# Patient Record
Sex: FEMALE | Race: WHITE | NOT HISPANIC OR LATINO
[De-identification: names, ages, dates, MRNs, and addresses within clinical notes are randomized per-mention and may not be internally consistent; named-entity substitution may affect disease eponyms.]

---

## 2017-01-24 ENCOUNTER — APPOINTMENT (OUTPATIENT)
Dept: OBGYN | Facility: CLINIC | Age: 62
End: 2017-01-24

## 2017-01-24 VITALS
DIASTOLIC BLOOD PRESSURE: 84 MMHG | OXYGEN SATURATION: 97 % | TEMPERATURE: 97.9 F | WEIGHT: 176 LBS | BODY MASS INDEX: 29.32 KG/M2 | SYSTOLIC BLOOD PRESSURE: 122 MMHG | HEART RATE: 82 BPM | RESPIRATION RATE: 16 BRPM | HEIGHT: 65 IN

## 2017-01-24 DIAGNOSIS — Z82.3 FAMILY HISTORY OF STROKE: ICD-10-CM

## 2017-01-24 DIAGNOSIS — Z82.49 FAMILY HISTORY OF ISCHEMIC HEART DISEASE AND OTHER DISEASES OF THE CIRCULATORY SYSTEM: ICD-10-CM

## 2017-01-26 ENCOUNTER — FORM ENCOUNTER (OUTPATIENT)
Age: 62
End: 2017-01-26

## 2017-01-26 LAB — HPV HIGH+LOW RISK DNA PNL CVX: NEGATIVE

## 2017-01-27 ENCOUNTER — APPOINTMENT (OUTPATIENT)
Dept: MAMMOGRAPHY | Facility: HOSPITAL | Age: 62
End: 2017-01-27

## 2017-01-27 ENCOUNTER — APPOINTMENT (OUTPATIENT)
Dept: RADIOLOGY | Facility: HOSPITAL | Age: 62
End: 2017-01-27

## 2017-01-27 ENCOUNTER — OUTPATIENT (OUTPATIENT)
Dept: OUTPATIENT SERVICES | Facility: HOSPITAL | Age: 62
LOS: 1 days | End: 2017-01-27
Payer: COMMERCIAL

## 2017-01-27 PROCEDURE — 77063 BREAST TOMOSYNTHESIS BI: CPT

## 2017-01-27 PROCEDURE — 77067 SCR MAMMO BI INCL CAD: CPT

## 2017-01-27 PROCEDURE — 77080 DXA BONE DENSITY AXIAL: CPT

## 2017-01-29 LAB — CYTOLOGY CVX/VAG DOC THIN PREP: NORMAL

## 2017-02-01 ENCOUNTER — FORM ENCOUNTER (OUTPATIENT)
Age: 62
End: 2017-02-01

## 2017-02-02 ENCOUNTER — OUTPATIENT (OUTPATIENT)
Dept: OUTPATIENT SERVICES | Facility: HOSPITAL | Age: 62
LOS: 1 days | End: 2017-02-02
Payer: COMMERCIAL

## 2017-02-02 ENCOUNTER — APPOINTMENT (OUTPATIENT)
Dept: ULTRASOUND IMAGING | Facility: HOSPITAL | Age: 62
End: 2017-02-02

## 2017-02-02 PROCEDURE — 76642 ULTRASOUND BREAST LIMITED: CPT

## 2017-11-02 ENCOUNTER — FORM ENCOUNTER (OUTPATIENT)
Age: 62
End: 2017-11-02

## 2017-11-03 ENCOUNTER — APPOINTMENT (OUTPATIENT)
Dept: MAMMOGRAPHY | Facility: HOSPITAL | Age: 62
End: 2017-11-03
Payer: COMMERCIAL

## 2017-11-03 ENCOUNTER — OUTPATIENT (OUTPATIENT)
Dept: OUTPATIENT SERVICES | Facility: HOSPITAL | Age: 62
LOS: 1 days | End: 2017-11-03
Payer: COMMERCIAL

## 2017-11-03 ENCOUNTER — APPOINTMENT (OUTPATIENT)
Dept: ULTRASOUND IMAGING | Facility: HOSPITAL | Age: 62
End: 2017-11-03
Payer: COMMERCIAL

## 2017-11-03 PROCEDURE — 76642 ULTRASOUND BREAST LIMITED: CPT

## 2017-11-03 PROCEDURE — G0206: CPT | Mod: 26,LT

## 2017-11-03 PROCEDURE — 76642 ULTRASOUND BREAST LIMITED: CPT | Mod: 26,LT

## 2017-11-03 PROCEDURE — G0279: CPT | Mod: 26

## 2017-11-03 PROCEDURE — G0279: CPT

## 2017-11-03 PROCEDURE — 77065 DX MAMMO INCL CAD UNI: CPT

## 2018-04-30 ENCOUNTER — APPOINTMENT (OUTPATIENT)
Dept: INTERNAL MEDICINE | Facility: CLINIC | Age: 63
End: 2018-04-30
Payer: COMMERCIAL

## 2018-04-30 VITALS — DIASTOLIC BLOOD PRESSURE: 80 MMHG | SYSTOLIC BLOOD PRESSURE: 128 MMHG

## 2018-04-30 VITALS — HEIGHT: 65 IN | BODY MASS INDEX: 29.16 KG/M2 | WEIGHT: 175 LBS

## 2018-04-30 DIAGNOSIS — Z12.11 ENCOUNTER FOR SCREENING FOR MALIGNANT NEOPLASM OF COLON: ICD-10-CM

## 2018-04-30 PROCEDURE — 99245 OFF/OP CONSLTJ NEW/EST HI 55: CPT

## 2018-04-30 RX ORDER — LOSARTAN POTASSIUM 50 MG/1
50 TABLET, FILM COATED ORAL
Refills: 0 | Status: DISCONTINUED | COMMUNITY
End: 2018-04-30

## 2018-05-31 ENCOUNTER — APPOINTMENT (OUTPATIENT)
Dept: INTERNAL MEDICINE | Facility: HOSPITAL | Age: 63
End: 2018-05-31
Payer: COMMERCIAL

## 2018-05-31 ENCOUNTER — RESULT REVIEW (OUTPATIENT)
Age: 63
End: 2018-05-31

## 2018-05-31 ENCOUNTER — OUTPATIENT (OUTPATIENT)
Dept: OUTPATIENT SERVICES | Facility: HOSPITAL | Age: 63
LOS: 1 days | End: 2018-05-31
Payer: COMMERCIAL

## 2018-05-31 DIAGNOSIS — Z12.11 ENCOUNTER FOR SCREENING FOR MALIGNANT NEOPLASM OF COLON: ICD-10-CM

## 2018-05-31 PROCEDURE — 45380 COLONOSCOPY AND BIOPSY: CPT | Mod: PT

## 2018-05-31 PROCEDURE — 88305 TISSUE EXAM BY PATHOLOGIST: CPT

## 2018-05-31 PROCEDURE — 45380 COLONOSCOPY AND BIOPSY: CPT

## 2018-05-31 PROCEDURE — 88305 TISSUE EXAM BY PATHOLOGIST: CPT | Mod: 26

## 2018-06-11 ENCOUNTER — APPOINTMENT (OUTPATIENT)
Dept: OBGYN | Facility: CLINIC | Age: 63
End: 2018-06-11
Payer: COMMERCIAL

## 2018-06-11 VITALS
BODY MASS INDEX: 29.16 KG/M2 | DIASTOLIC BLOOD PRESSURE: 90 MMHG | HEART RATE: 88 BPM | HEIGHT: 65 IN | SYSTOLIC BLOOD PRESSURE: 160 MMHG | OXYGEN SATURATION: 98 % | WEIGHT: 175 LBS

## 2018-06-11 PROCEDURE — 99396 PREV VISIT EST AGE 40-64: CPT

## 2018-06-14 LAB — HPV HIGH+LOW RISK DNA PNL CVX: NOT DETECTED

## 2018-06-18 LAB — CYTOLOGY CVX/VAG DOC THIN PREP: NORMAL

## 2018-07-12 ENCOUNTER — FORM ENCOUNTER (OUTPATIENT)
Age: 63
End: 2018-07-12

## 2018-07-13 ENCOUNTER — OUTPATIENT (OUTPATIENT)
Dept: OUTPATIENT SERVICES | Facility: HOSPITAL | Age: 63
LOS: 1 days | End: 2018-07-13
Payer: COMMERCIAL

## 2018-07-13 ENCOUNTER — APPOINTMENT (OUTPATIENT)
Dept: ULTRASOUND IMAGING | Facility: HOSPITAL | Age: 63
End: 2018-07-13

## 2018-07-13 ENCOUNTER — APPOINTMENT (OUTPATIENT)
Dept: MAMMOGRAPHY | Facility: HOSPITAL | Age: 63
End: 2018-07-13

## 2018-07-13 DIAGNOSIS — Z00.8 ENCOUNTER FOR OTHER GENERAL EXAMINATION: ICD-10-CM

## 2018-07-13 PROCEDURE — 77067 SCR MAMMO BI INCL CAD: CPT | Mod: 26

## 2018-07-13 PROCEDURE — 77063 BREAST TOMOSYNTHESIS BI: CPT

## 2018-07-13 PROCEDURE — 77067 SCR MAMMO BI INCL CAD: CPT

## 2018-07-13 PROCEDURE — 77063 BREAST TOMOSYNTHESIS BI: CPT | Mod: 26

## 2019-12-09 ENCOUNTER — APPOINTMENT (OUTPATIENT)
Dept: OBGYN | Facility: CLINIC | Age: 64
End: 2019-12-09
Payer: COMMERCIAL

## 2019-12-09 VITALS
OXYGEN SATURATION: 98 % | BODY MASS INDEX: 26.49 KG/M2 | WEIGHT: 159 LBS | HEIGHT: 65 IN | DIASTOLIC BLOOD PRESSURE: 80 MMHG | HEART RATE: 94 BPM | SYSTOLIC BLOOD PRESSURE: 120 MMHG

## 2019-12-09 PROCEDURE — 99396 PREV VISIT EST AGE 40-64: CPT

## 2019-12-09 NOTE — PHYSICAL EXAM
[Awake] : awake [Alert] : alert [Acute Distress] : no acute distress [Thyroid Nodule] : no thyroid nodule [LAD] : no lymphadenopathy [Goiter] : no goiter [Mass] : no breast mass [Axillary LAD] : no axillary lymphadenopathy [Nipple Discharge] : no nipple discharge [Tender] : non tender [Distended] : not distended [H/Smegaly] : no hepatosplenomegaly [Oriented x3] : oriented to person, place, and time [Flat Affect] : affect not flat [Depressed Mood] : not depressed [No Lesions] : no genitalia lesions [Vulvar Atrophy] : no vulvar atrophy [Vulvitis] : no vulvitis [Labia Minora Erythema] : no erythema of the labia minora [Labia Majora Erythema] : no erythema of the labia majora [Labia Majora] : labia major [Labia Minora] : labia minora [Normal] : clitoris [Atrophy] : atrophy [Erythema] : no erythema [Cystocele] : no cystocele [Rectocele] : no rectocele [Dry Mucosa] : moist mucosa [Uterine Prolapse] : no uterine prolapse [No Bleeding] : there was no active vaginal bleeding [Erosion] : had no erosions [Discharge] : had no discharge [Soft] :  the cervix was soft [Pap Obtained] : a Pap smear was performed [Motion Tenderness] : there was no cervical motion tenderness [Tenderness] : nontender [Normal Position] : in a normal position [Mass ___ cm] : no uterine mass was palpated [Uterine Adnexae] : were not tender and not enlarged [Enlarged ___ wks] : not enlarged [Adnexa Tenderness] : were not tender [Ovarian Mass (___ Cm)] : there were no adnexal masses [FreeTextEntry9] : Last colonoscopy 2017

## 2019-12-09 NOTE — HISTORY OF PRESENT ILLNESS
[Hot Flashes] : no hot flashes [Night Sweats] : no night sweats [Vaginal Itching] : no vaginal itching [Dyspareunia] : no dyspareunia [Headache] : no headache [Mood Changes] : no mood changes [Weight Change] : no weight change [Fatigue] : no fatigue [Irritability] : no irritability [Loss of Libido] : no loss of libido [Forgetfulness] : no forgetfulness [Depression] : no depression [Anxiety] : no anxiety [Spotting Between  Menses] : no spotting between menses [Normal Amount/Duration] : was of a normal amount and duration [Regular Cycle Intervals] : periods have been irregular [Menarche Age: ____] : age at menarche was [unfilled] [Menopause Age: ____] : age at menopause was [unfilled] [Menstrual Cramps] : no menstrual cramps [Sexually Active] : is sexually active [Monogamous] : is monogamous [Male ___] : [unfilled] male

## 2019-12-11 ENCOUNTER — FORM ENCOUNTER (OUTPATIENT)
Age: 64
End: 2019-12-11

## 2019-12-11 LAB — HPV HIGH+LOW RISK DNA PNL CVX: NOT DETECTED

## 2019-12-12 ENCOUNTER — OUTPATIENT (OUTPATIENT)
Dept: OUTPATIENT SERVICES | Facility: HOSPITAL | Age: 64
LOS: 1 days | End: 2019-12-12
Payer: COMMERCIAL

## 2019-12-12 ENCOUNTER — APPOINTMENT (OUTPATIENT)
Dept: MAMMOGRAPHY | Facility: HOSPITAL | Age: 64
End: 2019-12-12
Payer: COMMERCIAL

## 2019-12-12 DIAGNOSIS — Z00.8 ENCOUNTER FOR OTHER GENERAL EXAMINATION: ICD-10-CM

## 2019-12-12 PROCEDURE — 77067 SCR MAMMO BI INCL CAD: CPT | Mod: 26

## 2019-12-12 PROCEDURE — 77063 BREAST TOMOSYNTHESIS BI: CPT

## 2019-12-12 PROCEDURE — 77063 BREAST TOMOSYNTHESIS BI: CPT | Mod: 26

## 2019-12-12 PROCEDURE — 77067 SCR MAMMO BI INCL CAD: CPT

## 2019-12-13 LAB — CYTOLOGY CVX/VAG DOC THIN PREP: ABNORMAL

## 2020-11-25 ENCOUNTER — EMERGENCY (EMERGENCY)
Facility: HOSPITAL | Age: 65
LOS: 1 days | Discharge: HOME | End: 2020-11-25
Attending: EMERGENCY MEDICINE | Admitting: EMERGENCY MEDICINE
Payer: COMMERCIAL

## 2020-11-25 VITALS
DIASTOLIC BLOOD PRESSURE: 74 MMHG | HEART RATE: 76 BPM | RESPIRATION RATE: 18 BRPM | SYSTOLIC BLOOD PRESSURE: 153 MMHG | OXYGEN SATURATION: 98 %

## 2020-11-25 VITALS
HEIGHT: 65 IN | RESPIRATION RATE: 18 BRPM | WEIGHT: 154.98 LBS | OXYGEN SATURATION: 99 % | TEMPERATURE: 98 F | HEART RATE: 104 BPM | DIASTOLIC BLOOD PRESSURE: 84 MMHG | SYSTOLIC BLOOD PRESSURE: 212 MMHG

## 2020-11-25 DIAGNOSIS — I10 ESSENTIAL (PRIMARY) HYPERTENSION: ICD-10-CM

## 2020-11-25 LAB
ALBUMIN SERPL ELPH-MCNC: 4 G/DL — SIGNIFICANT CHANGE UP (ref 3.3–5)
ALP SERPL-CCNC: 81 U/L — SIGNIFICANT CHANGE UP (ref 40–120)
ALT FLD-CCNC: 21 U/L — SIGNIFICANT CHANGE UP (ref 10–45)
ANION GAP SERPL CALC-SCNC: 8 MMOL/L — SIGNIFICANT CHANGE UP (ref 5–17)
AST SERPL-CCNC: 24 U/L — SIGNIFICANT CHANGE UP (ref 10–40)
BASOPHILS # BLD AUTO: 0.06 K/UL — SIGNIFICANT CHANGE UP (ref 0–0.2)
BASOPHILS NFR BLD AUTO: 0.9 % — SIGNIFICANT CHANGE UP (ref 0–2)
BILIRUB SERPL-MCNC: 0.5 MG/DL — SIGNIFICANT CHANGE UP (ref 0.2–1.2)
BUN SERPL-MCNC: 11 MG/DL — SIGNIFICANT CHANGE UP (ref 7–23)
CALCIUM SERPL-MCNC: 9.3 MG/DL — SIGNIFICANT CHANGE UP (ref 8.4–10.5)
CHLORIDE SERPL-SCNC: 106 MMOL/L — SIGNIFICANT CHANGE UP (ref 96–108)
CO2 SERPL-SCNC: 30 MMOL/L — SIGNIFICANT CHANGE UP (ref 22–31)
CREAT SERPL-MCNC: 0.72 MG/DL — SIGNIFICANT CHANGE UP (ref 0.5–1.3)
EOSINOPHIL # BLD AUTO: 0.03 K/UL — SIGNIFICANT CHANGE UP (ref 0–0.5)
EOSINOPHIL NFR BLD AUTO: 0.4 % — SIGNIFICANT CHANGE UP (ref 0–6)
GLUCOSE SERPL-MCNC: 121 MG/DL — HIGH (ref 70–99)
HCT VFR BLD CALC: 44.7 % — SIGNIFICANT CHANGE UP (ref 34.5–45)
HGB BLD-MCNC: 15 G/DL — SIGNIFICANT CHANGE UP (ref 11.5–15.5)
IMM GRANULOCYTES NFR BLD AUTO: 0.3 % — SIGNIFICANT CHANGE UP (ref 0–1.5)
LYMPHOCYTES # BLD AUTO: 1.6 K/UL — SIGNIFICANT CHANGE UP (ref 1–3.3)
LYMPHOCYTES # BLD AUTO: 22.9 % — SIGNIFICANT CHANGE UP (ref 13–44)
MCHC RBC-ENTMCNC: 33.6 GM/DL — SIGNIFICANT CHANGE UP (ref 32–36)
MCHC RBC-ENTMCNC: 33.9 PG — SIGNIFICANT CHANGE UP (ref 27–34)
MCV RBC AUTO: 100.9 FL — HIGH (ref 80–100)
MONOCYTES # BLD AUTO: 0.57 K/UL — SIGNIFICANT CHANGE UP (ref 0–0.9)
MONOCYTES NFR BLD AUTO: 8.1 % — SIGNIFICANT CHANGE UP (ref 2–14)
NEUTROPHILS # BLD AUTO: 4.72 K/UL — SIGNIFICANT CHANGE UP (ref 1.8–7.4)
NEUTROPHILS NFR BLD AUTO: 67.4 % — SIGNIFICANT CHANGE UP (ref 43–77)
NRBC # BLD: 0 /100 WBCS — SIGNIFICANT CHANGE UP (ref 0–0)
PLATELET # BLD AUTO: 268 K/UL — SIGNIFICANT CHANGE UP (ref 150–400)
POTASSIUM SERPL-MCNC: 4.4 MMOL/L — SIGNIFICANT CHANGE UP (ref 3.5–5.3)
POTASSIUM SERPL-SCNC: 4.4 MMOL/L — SIGNIFICANT CHANGE UP (ref 3.5–5.3)
PROT SERPL-MCNC: 7.7 G/DL — SIGNIFICANT CHANGE UP (ref 6–8.3)
RBC # BLD: 4.43 M/UL — SIGNIFICANT CHANGE UP (ref 3.8–5.2)
RBC # FLD: 11.9 % — SIGNIFICANT CHANGE UP (ref 10.3–14.5)
SODIUM SERPL-SCNC: 144 MMOL/L — SIGNIFICANT CHANGE UP (ref 135–145)
WBC # BLD: 7 K/UL — SIGNIFICANT CHANGE UP (ref 3.8–10.5)
WBC # FLD AUTO: 7 K/UL — SIGNIFICANT CHANGE UP (ref 3.8–10.5)

## 2020-11-25 PROCEDURE — 93010 ELECTROCARDIOGRAM REPORT: CPT

## 2020-11-25 PROCEDURE — 93005 ELECTROCARDIOGRAM TRACING: CPT

## 2020-11-25 PROCEDURE — 85025 COMPLETE CBC W/AUTO DIFF WBC: CPT

## 2020-11-25 PROCEDURE — 99284 EMERGENCY DEPT VISIT MOD MDM: CPT

## 2020-11-25 PROCEDURE — 99283 EMERGENCY DEPT VISIT LOW MDM: CPT

## 2020-11-25 PROCEDURE — 80053 COMPREHEN METABOLIC PANEL: CPT

## 2020-11-25 PROCEDURE — 36415 COLL VENOUS BLD VENIPUNCTURE: CPT

## 2020-11-25 RX ORDER — LOSARTAN POTASSIUM 100 MG/1
25 TABLET, FILM COATED ORAL DAILY
Refills: 0 | Status: DISCONTINUED | OUTPATIENT
Start: 2020-11-25 | End: 2020-11-28

## 2020-11-25 RX ORDER — LOSARTAN POTASSIUM 100 MG/1
1 TABLET, FILM COATED ORAL
Qty: 21 | Refills: 0
Start: 2020-11-25 | End: 2020-12-15

## 2020-11-25 RX ADMIN — LOSARTAN POTASSIUM 25 MILLIGRAM(S): 100 TABLET, FILM COATED ORAL at 11:33

## 2020-11-25 NOTE — ED PROVIDER NOTE - PATIENT PORTAL LINK FT
You can access the FollowMyHealth Patient Portal offered by [facility] by registering at the following website: [email or web address]. By joining [employer]’s FollowMyHealth portal, you will also be able to view your health information using other applications (apps) compatible with our system.

## 2020-11-25 NOTE — ED PROVIDER NOTE - CLINICAL SUMMARY MEDICAL DECISION MAKING FREE TEXT BOX
asymptomatic HTN, /75 currently, no ekg changes, labs pending to check for end organ damage - if all WNL will dc, will restart Losartan, Family Practice f/u. Asymptomatic HTN, /75 currently, no ekg changes, labs pending to check for end organ damage - if all WNL will dc, will restart Losartan, Family Practice f/u.

## 2020-11-25 NOTE — ED PROVIDER NOTE - ATTENDING CONTRIBUTION TO CARE
Erik with [facility]. Asymptomatic HTN, /75 currently, no ekg changes, labs pending to check for end organ damage - if all WNL will dc, will restart Losartan, Family Practice f/u.  I performed a face to face bedside interview with patient regarding history of present illness, review of symptoms and past medical history. I completed an independent physical exam.  I have discussed the patient's plan of care with Physician Assistant (PA). I agree with note as stated above, having amended the EMR as needed to reflect my findings.   This includes History of Present Illness, HIV, Past Medical/Surgical/Family/Social History, Allergies and Home Medications, Review of Systems, Physical Exam, and any Progress Notes during the time I functioned as the attending physician for this patient.

## 2020-11-25 NOTE — ED PROVIDER NOTE - NSFOLLOWUPCLINICS_GEN_ALL_ED_FT
[facility]  Family Medicine  [address] [place] [id number]  Phone: [phone]  Fax:   Follow Up Time:

## 2020-11-25 NOTE — ED ADULT NURSE NOTE - OBJECTIVE STATEMENT
Pt presents to ED from home with c/o hypertension. Pt states for the past few days she has checked her blood pressure at home and noticed elevated numbers. Reports being on losartan in the past for HTN but has resolved until recently. Pt reports increased stress levels after her  passed away. Denies chest pain or SOB.

## 2020-11-25 NOTE — ED PROVIDER NOTE - NSFOLLOWUPINSTRUCTIONS_ED_ALL_ED_FT
Hypertension    WHAT YOU NEED TO KNOW:    Hypertension is high blood pressure. Your blood pressure is the force of your blood moving against the walls of your arteries. Hypertension causes your blood pressure to get so high that your heart has to work much harder than normal. This can damage your heart. The cause of hypertension may not be known. This is called essential or primary hypertension. Hypertension caused by another medical condition, such as kidney disease, is called secondary hypertension.    DISCHARGE INSTRUCTIONS:    Call 911 for any of the following:   •You have chest pain.      •You have any of the following signs of a heart attack: ?Squeezing, pressure, or pain in your chest      ?You may also have any of the following: ?Discomfort or pain in your back, neck, jaw, stomach, or arm      ?Shortness of breath      ?Nausea or vomiting      ?Lightheadedness or a sudden cold sweat        •You become confused or have difficulty speaking.      •You suddenly feel lightheaded or have trouble breathing.      Return to the emergency department if:   •You have a severe headache or vision loss.      •You have weakness in an arm or leg.       Contact your healthcare provider if:   •You feel faint, dizzy, confused, or drowsy.       •You have been taking your blood pressure medicine but your pressure is higher than your provider says it should be.      •You have questions or concerns about your condition or care.      Medicines: You may need any of the following:   •Antihypertensives may be used to help lower your blood pressure. Several kinds of medicines are available. Your healthcare provider will choose medicines based on the kind of hypertension you have. You may need more than one type of medicine. Take the medicine exactly as directed.       •Diuretics help decrease extra fluid that collects in your body. This will help lower your blood pressure. You may urinate more often while you take this medicine.      •Cholesterol medicine helps lower your cholesterol level. A low cholesterol level helps prevent heart disease and makes it easier to control your blood pressure.       •Take your medicine as directed. Contact your healthcare provider if you think your medicine is not helping or if you have side effects. Tell him or her if you are allergic to any medicine. Keep a list of the medicines, vitamins, and herbs you take. Include the amounts, and when and why you take them. Bring the list or the pill bottles to follow-up visits. Carry your medicine list with you in case of an emergency.      Follow up with your healthcare provider as directed: You will need to return to have your blood pressure checked and to have other lab tests done. Write down your questions so you remember to ask them during your visits.     Stages of hypertension:     Blood Pressure Readings     •Normal blood pressure is 119/79 or lower. Your healthcare provider may only check your blood pressure each year if it stays at a normal level.      •Elevated blood pressure is 120/79 to 129/79. This is sometimes called prehypertension. Your healthcare provider may suggest lifestyle changes to help lower your blood pressure to a normal level. He or she may then check it again in 3 to 6 months.      •Stage 1 hypertension is 130/80 to 139/89. Your provider may recommend lifestyle changes, medication, and checks every 3 to 6 months until your blood pressure is controlled.      •Stage 2 hypertension is 140/90 or higher. Your provider will recommend lifestyle changes and have you take 2 kinds of hypertension medicines. You will also need to have your blood pressure checked monthly until it is controlled.      Manage hypertension:   •Check your blood pressure at home. Avoid smoking, caffeine, and exercise at least 30 minutes before checking your blood pressure. Sit and rest for 5 minutes before you take your blood pressure. Extend your arm and support it on a flat surface. Your arm should be at the same level as your heart. Follow the directions that came with your blood pressure monitor. Check your blood pressure 2 times, 1 minute apart, before you take your medicine in the morning. Also check your blood pressure before your evening meal. Keep a record of your readings and bring it to your follow-up visits. Ask your healthcare provider what your blood pressure should be.   How to take a Blood Pressure           •Manage any other health conditions you have. Health conditions such as diabetes can increase your risk for hypertension. Follow your healthcare provider's instructions and take all your medicines as directed.       •Ask about all medicines. Certain medicines can increase your blood pressure. Examples include oral birth control pills, decongestants, herbal supplements, and NSAIDs, such as ibuprofen. Your healthcare provider can tell you which medicines are safe for you to take. This includes prescription and over-the-counter medicines.      Lifestyle changes you can make to manage hypertension:   •Limit sodium (salt) as directed. Too much sodium can affect your fluid balance. Check labels to find low-sodium or no-salt-added foods. Some low-sodium foods use potassium salts for flavor. Too much potassium can also cause health problems. Your healthcare provider will tell you how much sodium and potassium are safe for you to have in a day. He or she may recommend that you limit sodium to 2,300 mg a day.             •Follow the meal plan recommended by your healthcare provider. A dietitian or your provider can give you more information on low-sodium plans or the DASH (Dietary Approaches to Stop Hypertension) eating plan. The DASH plan is low in sodium, unhealthy fats, and total fat. It is high in potassium, calcium, and fiber.              •Exercise to maintain a healthy weight. Exercise at least 30 minutes per day, on most days of the week. This will help decrease your blood pressure. Ask your healthcare provider about the best exercise plan for you.   Walking for Exercise           •Decrease stress. This may help lower your blood pressure. Learn ways to relax, such as deep breathing or listening to music.      •Limit alcohol as directed. Alcohol can increase your blood pressure. A drink of alcohol is 12 ounces of beer, 5 ounces of wine, or 1½ ounces of liquor.      •Do not smoke. Nicotine and other chemicals in cigarettes and cigars can increase your blood pressure and also cause lung damage. Ask your healthcare provider for information if you currently smoke and need help to quit. E-cigarettes or smokeless tobacco still contain nicotine. Talk to your healthcare provider before you use these products.   Prevent Heart Disease

## 2020-11-25 NOTE — ED PROVIDER NOTE - CHPI ED SYMPTOMS NEG
no diaphoresis/no chest pain/no nausea/no back pain/no dizziness/no fever/no vomiting/no syncope/no shortness of breath

## 2020-12-08 PROBLEM — I10 ESSENTIAL (PRIMARY) HYPERTENSION: Chronic | Status: ACTIVE | Noted: 2020-11-25

## 2020-12-09 ENCOUNTER — APPOINTMENT (OUTPATIENT)
Dept: FAMILY MEDICINE | Facility: CLINIC | Age: 65
End: 2020-12-09
Payer: COMMERCIAL

## 2020-12-09 ENCOUNTER — APPOINTMENT (OUTPATIENT)
Dept: OBGYN | Facility: CLINIC | Age: 65
End: 2020-12-09

## 2020-12-09 VITALS
RESPIRATION RATE: 14 BRPM | WEIGHT: 156 LBS | DIASTOLIC BLOOD PRESSURE: 84 MMHG | TEMPERATURE: 98 F | SYSTOLIC BLOOD PRESSURE: 168 MMHG | BODY MASS INDEX: 25.99 KG/M2 | HEART RATE: 86 BPM | OXYGEN SATURATION: 99 % | HEIGHT: 65 IN

## 2020-12-09 DIAGNOSIS — Z11.59 ENCOUNTER FOR SCREENING FOR OTHER VIRAL DISEASES: ICD-10-CM

## 2020-12-09 DIAGNOSIS — Z86.79 PERSONAL HISTORY OF OTHER DISEASES OF THE CIRCULATORY SYSTEM: ICD-10-CM

## 2020-12-09 DIAGNOSIS — Z63.4 DISAPPEARANCE AND DEATH OF FAMILY MEMBER: ICD-10-CM

## 2020-12-09 DIAGNOSIS — R92.8 OTHER ABNORMAL AND INCONCLUSIVE FINDINGS ON DIAGNOSTIC IMAGING OF BREAST: ICD-10-CM

## 2020-12-09 PROCEDURE — 99072 ADDL SUPL MATRL&STAF TM PHE: CPT

## 2020-12-09 PROCEDURE — 99203 OFFICE O/P NEW LOW 30 MIN: CPT

## 2020-12-09 SDOH — SOCIAL STABILITY - SOCIAL INSECURITY: DISSAPEARANCE AND DEATH OF FAMILY MEMBER: Z63.4

## 2020-12-09 NOTE — ASSESSMENT
[FreeTextEntry1] : bp elevated today, advised will note in chart, keep a home bp log for the next 2 weeks bring to next visit. \par avoid high sodium diet, caffeinated beverages. exercise, keep fit weight\par script for comprehensive fasting blood work provided\par RTO in 2 weeks for CPE visit.

## 2020-12-09 NOTE — HISTORY OF PRESENT ILLNESS
[FreeTextEntry8] : establish care, concern HTN\par Ms Marielena Landaverde is a 66 yo female presents today to establish care. Pt recently was in [place] [facility] for elevated bp. In the past was on Losartan which later stopped, when bp optimized. Lately bp rising again. Pt lost  in April 2020, pt also in bereavement. Pt advised today to got comprehensive fasting labwork will return for comprehensive physical. otherwise denies any active complaint.

## 2020-12-09 NOTE — HEALTH RISK ASSESSMENT
[Intercurrent ED visits] : went to ED [Yes] : Yes [No] : In the past 12 months have you used drugs other than those required for medical reasons? No [No falls in past year] : Patient reported no falls in the past year [] : No [de-identified] : Bandar HENRY [de-identified] : wine [FreeTextEntry1] : bereavement from recent loss of ever April 2020

## 2020-12-09 NOTE — PHYSICAL EXAM
[No Acute Distress] : no acute distress [Well Nourished] : well nourished [EOMI] : extraocular movements intact [Normal Oropharynx] : the oropharynx was normal [Supple] : supple [Clear to Auscultation] : lungs were clear to auscultation bilaterally [Normal S1, S2] : normal S1 and S2 [No Edema] : there was no peripheral edema [Soft] : abdomen soft [Non Tender] : non-tender [No Rash] : no rash [Normal Gait] : normal gait [Normal Affect] : the affect was normal

## 2020-12-16 ENCOUNTER — OUTPATIENT (OUTPATIENT)
Dept: OUTPATIENT SERVICES | Facility: HOSPITAL | Age: 65
LOS: 1 days | End: 2020-12-16
Payer: COMMERCIAL

## 2020-12-16 ENCOUNTER — RESULT REVIEW (OUTPATIENT)
Age: 65
End: 2020-12-16

## 2020-12-16 ENCOUNTER — APPOINTMENT (OUTPATIENT)
Dept: MAMMOGRAPHY | Facility: HOSPITAL | Age: 65
End: 2020-12-16
Payer: COMMERCIAL

## 2020-12-16 DIAGNOSIS — Z00.8 ENCOUNTER FOR OTHER GENERAL EXAMINATION: ICD-10-CM

## 2020-12-16 PROBLEM — Z12.11 ENCOUNTER FOR SCREENING COLONOSCOPY: Status: RESOLVED | Noted: 2018-04-30 | Resolved: 2020-12-16

## 2020-12-16 PROCEDURE — 77063 BREAST TOMOSYNTHESIS BI: CPT | Mod: 26

## 2020-12-16 PROCEDURE — 77067 SCR MAMMO BI INCL CAD: CPT | Mod: 26

## 2020-12-16 PROCEDURE — 77067 SCR MAMMO BI INCL CAD: CPT

## 2020-12-16 PROCEDURE — 77063 BREAST TOMOSYNTHESIS BI: CPT

## 2020-12-18 LAB
25(OH)D3 SERPL-MCNC: 56.6 NG/ML
ALBUMIN SERPL ELPH-MCNC: 4.5 G/DL
ALP BLD-CCNC: 80 U/L
ALT SERPL-CCNC: 15 U/L
ANION GAP SERPL CALC-SCNC: 13 MMOL/L
APPEARANCE: CLEAR
AST SERPL-CCNC: 15 U/L
BASOPHILS # BLD AUTO: 0.07 K/UL
BASOPHILS NFR BLD AUTO: 1.3 %
BILIRUB SERPL-MCNC: 0.4 MG/DL
BILIRUBIN URINE: NEGATIVE
BLOOD URINE: NEGATIVE
BUN SERPL-MCNC: 18 MG/DL
CALCIUM SERPL-MCNC: 9.4 MG/DL
CHLORIDE SERPL-SCNC: 103 MMOL/L
CHOLEST SERPL-MCNC: 169 MG/DL
CO2 SERPL-SCNC: 26 MMOL/L
COLOR: NORMAL
CREAT SERPL-MCNC: 0.81 MG/DL
EOSINOPHIL # BLD AUTO: 0.07 K/UL
EOSINOPHIL NFR BLD AUTO: 1.3 %
ESTIMATED AVERAGE GLUCOSE: 105 MG/DL
FOLATE SERPL-MCNC: >20 NG/ML
GLUCOSE QUALITATIVE U: NEGATIVE
GLUCOSE SERPL-MCNC: 104 MG/DL
HBA1C MFR BLD HPLC: 5.3 %
HCT VFR BLD CALC: 44.1 %
HCV AB SER QL: NONREACTIVE
HCV S/CO RATIO: 0.09 S/CO
HDLC SERPL-MCNC: 50 MG/DL
HGB BLD-MCNC: 14.2 G/DL
HIV1+2 AB SPEC QL IA.RAPID: NONREACTIVE
IMM GRANULOCYTES NFR BLD AUTO: 0.2 %
KETONES URINE: NEGATIVE
LDLC SERPL CALC-MCNC: 103 MG/DL
LDLC SERPL DIRECT ASSAY-MCNC: 108 MG/DL
LEUKOCYTE ESTERASE URINE: NEGATIVE
LYMPHOCYTES # BLD AUTO: 1.83 K/UL
LYMPHOCYTES NFR BLD AUTO: 34.6 %
MAN DIFF?: NORMAL
MCHC RBC-ENTMCNC: 32.2 GM/DL
MCHC RBC-ENTMCNC: 32.6 PG
MCV RBC AUTO: 101.1 FL
MONOCYTES # BLD AUTO: 0.39 K/UL
MONOCYTES NFR BLD AUTO: 7.4 %
NEUTROPHILS # BLD AUTO: 2.92 K/UL
NEUTROPHILS NFR BLD AUTO: 55.2 %
NITRITE URINE: NEGATIVE
NONHDLC SERPL-MCNC: 119 MG/DL
PH URINE: 6
PLATELET # BLD AUTO: 310 K/UL
POTASSIUM SERPL-SCNC: 4.5 MMOL/L
PROT SERPL-MCNC: 7 G/DL
PROTEIN URINE: NEGATIVE
RBC # BLD: 4.36 M/UL
RBC # FLD: 11.8 %
SARS-COV-2 IGG SERPL IA-ACNC: <0.1 INDEX
SARS-COV-2 IGG SERPL QL IA: NEGATIVE
SODIUM SERPL-SCNC: 141 MMOL/L
SPECIFIC GRAVITY URINE: 1.02
T4 FREE SERPL-MCNC: 1.3 NG/DL
TRIGL SERPL-MCNC: 82 MG/DL
TSH SERPL-ACNC: 2.6 UIU/ML
UROBILINOGEN URINE: NORMAL
VIT B12 SERPL-MCNC: 1260 PG/ML
WBC # FLD AUTO: 5.29 K/UL

## 2020-12-23 ENCOUNTER — APPOINTMENT (OUTPATIENT)
Dept: FAMILY MEDICINE | Facility: CLINIC | Age: 65
End: 2020-12-23
Payer: COMMERCIAL

## 2020-12-23 ENCOUNTER — NON-APPOINTMENT (OUTPATIENT)
Age: 65
End: 2020-12-23

## 2020-12-23 VITALS
BODY MASS INDEX: 26.29 KG/M2 | HEIGHT: 64 IN | OXYGEN SATURATION: 99 % | DIASTOLIC BLOOD PRESSURE: 94 MMHG | SYSTOLIC BLOOD PRESSURE: 176 MMHG | RESPIRATION RATE: 16 BRPM | TEMPERATURE: 97.6 F | WEIGHT: 154 LBS | HEART RATE: 93 BPM

## 2020-12-23 VITALS — DIASTOLIC BLOOD PRESSURE: 80 MMHG | SYSTOLIC BLOOD PRESSURE: 140 MMHG

## 2020-12-23 DIAGNOSIS — Z23 ENCOUNTER FOR IMMUNIZATION: ICD-10-CM

## 2020-12-23 PROCEDURE — 93000 ELECTROCARDIOGRAM COMPLETE: CPT

## 2020-12-23 PROCEDURE — 99396 PREV VISIT EST AGE 40-64: CPT | Mod: 25

## 2020-12-23 PROCEDURE — 99072 ADDL SUPL MATRL&STAF TM PHE: CPT

## 2020-12-23 NOTE — HEALTH RISK ASSESSMENT
[Good] : ~his/her~ current health as good [Fair] :  ~his/her~ mood as fair [No] : In the past 12 months have you used drugs other than those required for medical reasons? No [No falls in past year] : Patient reported no falls in the past year [1] : 2) Feeling down, depressed, or hopeless for several days (1) [Patient reported mammogram was normal] : Patient reported mammogram was normal [Patient reported PAP Smear was normal] : Patient reported PAP Smear was normal [Patient reported bone density results were normal] : Patient reported bone density results were normal [Patient reported colonoscopy was normal] : Patient reported colonoscopy was normal [HIV Test offered] : HIV Test offered [Hepatitis C test offered] : Hepatitis C test offered [Change in mental status noted] : Change in mental status noted [None] : None [Alone] : lives alone [Employed] : employed [College] : College [] :  [Feels Safe at Home] : Feels safe at home [Fully functional (bathing, dressing, toileting, transferring, walking, feeding)] : Fully functional (bathing, dressing, toileting, transferring, walking, feeding) [Fully functional (using the telephone, shopping, preparing meals, housekeeping, doing laundry, using] : Fully functional and needs no help or supervision to perform IADLs (using the telephone, shopping, preparing meals, housekeeping, doing laundry, using transportation, managing medications and managing finances) [Smoke Detector] : smoke detector [Carbon Monoxide Detector] : carbon monoxide detector [Safety elements used in home] : safety elements used in home [Seat Belt] :  uses seat belt [Sunscreen] : uses sunscreen [Reviewed no changes] : Reviewed no changes [Name: ___] : Health Care Proxy's Name: [unfilled]  [Relationship: ___] : Relationship: [unfilled] [DNR] : DNR [DNI] : DNI [] : No [FreeTextEntry1] : bereavement [Language] : denies difficulty with language [Behavior] : denies difficulty with behavior [Learning/Retaining New Information] : denies difficulty learning/retaining new information [Handling Complex Tasks] : denies difficulty handling complex tasks [Reasoning] : denies difficulty with reasoning [Spatial Ability and Orientation] : denies difficulty with spatial ability and orientation [Sexually Active] : not sexually active [High Risk Behavior] : no high risk behavior [Reports changes in hearing] : Reports no changes in hearing [Reports changes in vision] : Reports no changes in vision [Reports changes in dental health] : Reports no changes in dental health [Guns at Home] : no guns at home [MammogramDate] : 12/2020 [PapSmearDate] : 2019 [PapSmearComments] : ob/gyn follow up provided [BoneDensityDate] : 2016 [ColonoscopyDate] : 2018 [ColonoscopyComments] : 5 years due in 2023 [HIVDate] : 12/2020 [HIVComments] : negative [HepatitisCDate] : 12/2020 [HepatitisCComments] : negative [de-identified] : bereavement [de-identified] : working from home [AdvancecareDate] : 12/23/2020 [FreeTextEntry4] : [phone]: Daughter Serene Coronado\par [place] Son [phone]

## 2020-12-23 NOTE — ASSESSMENT
[FreeTextEntry1] : complete physical exam, blood work and urinalysis reviewed\par flu vaccine up to date, mammogram up to date, colonoscopy up to date\par due for Tdap, shingrix will consider for next visit\par vit b12, every other day\par Pt current in sad affect, bereavement from recent loss of  and during the holidays times\par Pt advised to increase bp med losartan to 50mg daily, low sodium diet, low caffeine\par RTO in 3 months for follow up.

## 2020-12-23 NOTE — HISTORY OF PRESENT ILLNESS
[FreeTextEntry1] : comprehensive visit, HTN follow up [de-identified] : Ms Marielena aLndaverde is a 64 yo female presents today for comprehensive physical exam. Labwork was reviewed which was WNL, vit b12 slightly elevated, advised every other day of supplement. BP still not under control, home bp reading majority above 140/90, advised today will now titrate the losartan to 50mg daily, also advised decaf, low sodium diet. Pt up to date with flu vaccine, will like to consider Tdap, shingrix for a later date.

## 2020-12-23 NOTE — PHYSICAL EXAM
[No Acute Distress] : no acute distress [Well Nourished] : well nourished [EOMI] : extraocular movements intact [Normal Oropharynx] : the oropharynx was normal [Supple] : supple [Clear to Auscultation] : lungs were clear to auscultation bilaterally [Normal S1, S2] : normal S1 and S2 [No Edema] : there was no peripheral edema [Soft] : abdomen soft [Non Tender] : non-tender [No CVA Tenderness] : no CVA  tenderness [No Spinal Tenderness] : no spinal tenderness [No Rash] : no rash [Normal Gait] : normal gait [Normal Affect] : the affect was normal

## 2021-12-09 ENCOUNTER — APPOINTMENT (OUTPATIENT)
Dept: FAMILY MEDICINE | Facility: CLINIC | Age: 66
End: 2021-12-09
Payer: MEDICARE

## 2021-12-09 VITALS
DIASTOLIC BLOOD PRESSURE: 80 MMHG | RESPIRATION RATE: 15 BRPM | SYSTOLIC BLOOD PRESSURE: 158 MMHG | HEIGHT: 64 IN | TEMPERATURE: 97.3 F | BODY MASS INDEX: 27.14 KG/M2 | HEART RATE: 94 BPM | WEIGHT: 159 LBS | OXYGEN SATURATION: 96 %

## 2021-12-09 DIAGNOSIS — Z00.00 ENCOUNTER FOR GENERAL ADULT MEDICAL EXAMINATION W/OUT ABNORMAL FINDINGS: ICD-10-CM

## 2021-12-09 PROCEDURE — 99214 OFFICE O/P EST MOD 30 MIN: CPT

## 2021-12-09 NOTE — PHYSICAL EXAM
[Well Nourished] : well nourished [EOMI] : extraocular movements intact [Supple] : supple [Clear to Auscultation] : lungs were clear to auscultation bilaterally [Normal S1, S2] : normal S1 and S2 [No Edema] : there was no peripheral edema [Soft] : abdomen soft [Non Tender] : non-tender [No Rash] : no rash [Normal Gait] : normal gait [Normal Affect] : the affect was normal [de-identified] : mildly distressed [de-identified] : edematous nasal turbinates, tenderness with palpation, b/l maxillary and ethmoid sinuses, greenish discharge

## 2021-12-09 NOTE — HISTORY OF PRESENT ILLNESS
[FreeTextEntry8] : sinus congestion, pressure headaches, HTN\par Ms. Marielena Shelton is a 67 yo female presents today for symptoms of sinus congestion, pressure headaches, for the last 2 weeks, not resolving. Also noted now, bp higher than normal. Pt advised in lieu of current acute sinusitis, will treat with a course of Augmentin, and flonase as needed, advised warm humidified air and warm fluids. After resolution of sinusitis if bp continues to persist being high. Then advised to return for reevaluation. Advised pt to also return for CPE visit.

## 2021-12-09 NOTE — ASSESSMENT
[FreeTextEntry1] : advised symptomatic management, plenty of rest, warm fluids, hand washing, warm humidified air, Flonase, Augmentin BID next 7 days,  tylenol for fever, if symptoms worsen, new onset, fever, chills, return to office for re-evaluation. \par script for fasting comprehensive labwork provided\par advised to return for CPE visit.

## 2021-12-13 ENCOUNTER — APPOINTMENT (OUTPATIENT)
Dept: OBGYN | Facility: CLINIC | Age: 66
End: 2021-12-13
Payer: COMMERCIAL

## 2021-12-13 VITALS
DIASTOLIC BLOOD PRESSURE: 82 MMHG | HEIGHT: 64 IN | BODY MASS INDEX: 26.29 KG/M2 | HEART RATE: 94 BPM | SYSTOLIC BLOOD PRESSURE: 138 MMHG | WEIGHT: 154 LBS | OXYGEN SATURATION: 98 % | TEMPERATURE: 97.6 F

## 2021-12-13 PROCEDURE — 99397 PER PM REEVAL EST PAT 65+ YR: CPT

## 2021-12-13 NOTE — HISTORY OF PRESENT ILLNESS
[LMPDate] : age 51 [[facility] [[facility]] : 2 [[facility]xABSpont] : 1 [FreeTextEntry1] :  X 2

## 2021-12-13 NOTE — PHYSICAL EXAM
[Appropriately responsive] : appropriately responsive [Alert] : alert [No Acute Distress] : no acute distress [No Lymphadenopathy] : no lymphadenopathy [Soft] : soft [Non-tender] : non-tender [No Lesions] : no lesions [No Mass] : no mass [Oriented x3] : oriented x3 [Examination Of The Breasts] : a normal appearance [No Discharge] : no discharge [No Masses] : no breast masses were palpable [Labia Majora] : normal [Labia Minora] : normal [No Bleeding] : There was no active vaginal bleeding [Normal] : normal [Tenderness] : nontender [Enlarged ___ wks] : not enlarged [Mass ___ cm] : no uterine mass was palpated [Uterine Adnexae] : normal [FreeTextEntry9] : Last colonoscopy 2018

## 2021-12-18 ENCOUNTER — LABORATORY RESULT (OUTPATIENT)
Age: 66
End: 2021-12-18

## 2021-12-20 ENCOUNTER — LABORATORY RESULT (OUTPATIENT)
Age: 66
End: 2021-12-20

## 2021-12-20 ENCOUNTER — APPOINTMENT (OUTPATIENT)
Dept: OTOLARYNGOLOGY | Facility: CLINIC | Age: 66
End: 2021-12-20
Payer: MEDICARE

## 2021-12-20 VITALS
DIASTOLIC BLOOD PRESSURE: 88 MMHG | BODY MASS INDEX: 26.29 KG/M2 | SYSTOLIC BLOOD PRESSURE: 150 MMHG | OXYGEN SATURATION: 99 % | HEART RATE: 86 BPM | HEIGHT: 64 IN | WEIGHT: 154 LBS

## 2021-12-20 PROCEDURE — 31231 NASAL ENDOSCOPY DX: CPT

## 2021-12-20 PROCEDURE — 99203 OFFICE O/P NEW LOW 30 MIN: CPT | Mod: 25

## 2021-12-20 RX ORDER — AMOXICILLIN AND CLAVULANATE POTASSIUM 875; 125 MG/1; MG/1
875-125 TABLET, COATED ORAL
Qty: 14 | Refills: 0 | Status: COMPLETED | COMMUNITY
Start: 2021-12-09 | End: 2021-12-20

## 2021-12-20 NOTE — PHYSICAL EXAM
[Midline] : trachea located in midline position [Normal] : no rashes [de-identified] : +torus mandibularis [de-identified] : +torus palatinus

## 2021-12-20 NOTE — HISTORY OF PRESENT ILLNESS
[Facial Pain] : facial pain [Headache] : headache [Facial Pressure] : facial pressure [Nasal Congestion] : nasal congestion [Postnasal Drainage] : postnasal drainage [de-identified] : Ms. ORTEGA is a 66 year female who presents with recent suspected sinus infection.  She reports that she has been treating it previously with Zyrtec.  She reports feeling increased sinus pressure with congestion.  She saw her PCP, Dr. Hogan, on December 9 for elevated BP and also noted that she had sinus issues.  He prescribed Augmentin and Flonase of which she took for about a week.  She notes some improvement but still reports that it has not cleared completely.  She does report feeling better after a shower for a short period of time. [Clear Rhinorrhea] : no clear rhinorrhea

## 2021-12-20 NOTE — PROCEDURE
[Recalcitrant Symptoms] : recalcitrant symptoms  [Anterior rhinoscopy insufficient to account for symptoms] : anterior rhinoscopy insufficient to account for symptoms [Topical Lidocaine] : topical lidocaine [Oxymetazoline HCl] : oxymetazoline HCl [Flexible Endoscope] : examined with the flexible endoscope [Serial Number: ___] : Serial Number: [unfilled] [Red] : red [Deviated to the Rt] : deviated to the right [Normal] : the paranasal sinuses had no abnormalities

## 2021-12-20 NOTE — CONSULT LETTER
[Dear  ___] : Dear  [unfilled], [Consult Letter:] : I had the pleasure of evaluating your patient, [unfilled]. [Please see my note below.] : Please see my note below. [Consult Closing:] : Thank you very much for allowing me to participate in the care of this patient.  If you have any questions, please do not hesitate to contact me. [Sincerely,] : Sincerely, [FreeTextEntry2] : Juan Hogan MD ([facility])\par  [FreeTextEntry3] : Jose Angel Berrios MD, FACS\par Chief of Otolaryngology [facility]\par  - Dept. of Otolaryngology\par [facility] [employer]\par \par

## 2021-12-20 NOTE — REVIEW OF SYSTEMS
[Post Nasal Drip] : post nasal drip [Nasal Congestion] : nasal congestion [Recurrent Sinus Infections] : recurrent sinus infections [Sinus Pressure] : sinus pressure [Discolored Nasal Discharge] : discolored nasal discharge [Negative] : Heme/Lymph

## 2021-12-20 NOTE — ASSESSMENT
[FreeTextEntry1] : Pt has no endoscopic evidence of a bacterial sinusitis.  Her findings are more c/w acute rhinitis. Pt to continue to use Fluticasone spray.\par \par If symptoms persist beyond another 10 days we will consider doing a sinus CT.

## 2021-12-21 ENCOUNTER — TRANSCRIPTION ENCOUNTER (OUTPATIENT)
Age: 66
End: 2021-12-21

## 2021-12-21 LAB
25(OH)D3 SERPL-MCNC: 48.4 NG/ML
ALBUMIN SERPL ELPH-MCNC: 4.4 G/DL
ALP BLD-CCNC: 82 U/L
ALT SERPL-CCNC: 13 U/L
ANION GAP SERPL CALC-SCNC: 14 MMOL/L
APPEARANCE: CLEAR
AST SERPL-CCNC: 19 U/L
BASOPHILS # BLD AUTO: 0.07 K/UL
BASOPHILS NFR BLD AUTO: 1.1 %
BILIRUB SERPL-MCNC: 0.5 MG/DL
BILIRUBIN URINE: NEGATIVE
BLOOD URINE: NEGATIVE
BUN SERPL-MCNC: 11 MG/DL
CALCIUM SERPL-MCNC: 9.3 MG/DL
CHLORIDE SERPL-SCNC: 103 MMOL/L
CHOLEST SERPL-MCNC: 176 MG/DL
CO2 SERPL-SCNC: 25 MMOL/L
COLOR: YELLOW
CREAT SERPL-MCNC: 0.73 MG/DL
EOSINOPHIL # BLD AUTO: 0.04 K/UL
EOSINOPHIL NFR BLD AUTO: 0.6 %
ESTIMATED AVERAGE GLUCOSE: 105 MG/DL
FOLATE SERPL-MCNC: >20 NG/ML
GLUCOSE QUALITATIVE U: NEGATIVE
GLUCOSE SERPL-MCNC: 128 MG/DL
HBA1C MFR BLD HPLC: 5.3 %
HCT VFR BLD CALC: 42.7 %
HDLC SERPL-MCNC: 58 MG/DL
HGB BLD-MCNC: 14.2 G/DL
IMM GRANULOCYTES NFR BLD AUTO: 0.2 %
KETONES URINE: NEGATIVE
LDLC SERPL CALC-MCNC: 103 MG/DL
LDLC SERPL DIRECT ASSAY-MCNC: 107 MG/DL
LEUKOCYTE ESTERASE URINE: ABNORMAL
LYMPHOCYTES # BLD AUTO: 2.09 K/UL
LYMPHOCYTES NFR BLD AUTO: 33.9 %
MAN DIFF?: NORMAL
MCHC RBC-ENTMCNC: 33 PG
MCHC RBC-ENTMCNC: 33.3 GM/DL
MCV RBC AUTO: 99.3 FL
MONOCYTES # BLD AUTO: 0.57 K/UL
MONOCYTES NFR BLD AUTO: 9.2 %
NEUTROPHILS # BLD AUTO: 3.39 K/UL
NEUTROPHILS NFR BLD AUTO: 55 %
NITRITE URINE: NEGATIVE
NONHDLC SERPL-MCNC: 118 MG/DL
PH URINE: 6
PLATELET # BLD AUTO: 306 K/UL
POTASSIUM SERPL-SCNC: 4.1 MMOL/L
PROT SERPL-MCNC: 7.1 G/DL
PROTEIN URINE: ABNORMAL
RBC # BLD: 4.3 M/UL
RBC # FLD: 12.2 %
SODIUM SERPL-SCNC: 142 MMOL/L
SPECIFIC GRAVITY URINE: 1.03
T4 FREE SERPL-MCNC: 1.5 NG/DL
TRIGL SERPL-MCNC: 75 MG/DL
TSH SERPL-ACNC: 2.33 UIU/ML
UROBILINOGEN URINE: NORMAL
VIT B12 SERPL-MCNC: 1206 PG/ML
WBC # FLD AUTO: 6.17 K/UL

## 2021-12-26 LAB
CYTOLOGY CVX/VAG DOC THIN PREP: ABNORMAL
HPV HIGH+LOW RISK DNA PNL CVX: NOT DETECTED

## 2022-01-03 ENCOUNTER — NON-APPOINTMENT (OUTPATIENT)
Age: 67
End: 2022-01-03

## 2022-01-03 ENCOUNTER — APPOINTMENT (OUTPATIENT)
Dept: FAMILY MEDICINE | Facility: CLINIC | Age: 67
End: 2022-01-03
Payer: MEDICARE

## 2022-01-03 VITALS
WEIGHT: 156 LBS | BODY MASS INDEX: 26.63 KG/M2 | RESPIRATION RATE: 15 BRPM | TEMPERATURE: 98 F | HEIGHT: 64 IN | SYSTOLIC BLOOD PRESSURE: 168 MMHG | DIASTOLIC BLOOD PRESSURE: 90 MMHG | OXYGEN SATURATION: 99 % | HEART RATE: 111 BPM

## 2022-01-03 VITALS — SYSTOLIC BLOOD PRESSURE: 130 MMHG | DIASTOLIC BLOOD PRESSURE: 70 MMHG

## 2022-01-03 DIAGNOSIS — Z87.09 PERSONAL HISTORY OF OTHER DISEASES OF THE RESPIRATORY SYSTEM: ICD-10-CM

## 2022-01-03 DIAGNOSIS — J00 ACUTE NASOPHARYNGITIS [COMMON COLD]: ICD-10-CM

## 2022-01-03 DIAGNOSIS — Z23 ENCOUNTER FOR IMMUNIZATION: ICD-10-CM

## 2022-01-03 PROCEDURE — 90670 PCV13 VACCINE IM: CPT

## 2022-01-03 PROCEDURE — 93000 ELECTROCARDIOGRAM COMPLETE: CPT | Mod: 59

## 2022-01-03 PROCEDURE — G0402 INITIAL PREVENTIVE EXAM: CPT

## 2022-01-03 PROCEDURE — G0008: CPT

## 2022-01-03 PROCEDURE — 90686 IIV4 VACC NO PRSV 0.5 ML IM: CPT

## 2022-01-03 PROCEDURE — G0009: CPT

## 2022-01-03 NOTE — ASSESSMENT
[FreeTextEntry1] : completed physical exam, blood work and urinalysis results reviewed\par BP manual recheck 130/70s, c/w current medication regimen\par low sodium diet, fit weight, avoid high caffeinated beverages\par flu vaccine and Prevnar 13 updated\par Pneumovax 23 next year\par up to date with mammogram, DEXA, colonoscopy, pap. \par RTO in 4-6 months follow or as needed.

## 2022-01-03 NOTE — HISTORY OF PRESENT ILLNESS
[FreeTextEntry1] : comprehensive  [de-identified] : Ms Marielena Landaverde is a 67 yo female presents today for annual comprehensive visit. Pt recent labwork done last month, which for the most part was wnl, advised increase hydration. BP still fairing above normal, states currently taking Losartan 50mg daily, and lowering high sodium diet, and caffeinated beverages.

## 2022-01-03 NOTE — PHYSICAL EXAM
[Well Nourished] : well nourished [EOMI] : extraocular movements intact [Supple] : supple [Clear to Auscultation] : lungs were clear to auscultation bilaterally [Normal S1, S2] : normal S1 and S2 [No Edema] : there was no peripheral edema [Soft] : abdomen soft [Non Tender] : non-tender [No Rash] : no rash [Normal Gait] : normal gait [Normal Affect] : the affect was normal [de-identified] : mildly edematous nasal turbinates

## 2022-01-03 NOTE — HEALTH RISK ASSESSMENT
[Good] : ~his/her~  mood as  good [Never] : Never [Yes] : Yes [4 or more  times a week (4 pts)] : 4 or more  times a week (4 points) [1 or 2 (0 pts)] : 1 or 2 (0 points) [Never (0 pts)] : Never (0 points) [No] : In the past 12 months have you used drugs other than those required for medical reasons? No [No falls in past year] : Patient reported no falls in the past year [0] : 2) Feeling down, depressed, or hopeless: Not at all (0) [PHQ-2 Negative - No further assessment needed] : PHQ-2 Negative - No further assessment needed [Patient reported mammogram was normal] : Patient reported mammogram was normal [Patient reported PAP Smear was normal] : Patient reported PAP Smear was normal [Patient reported bone density results were normal] : Patient reported bone density results were normal [Patient reported colonoscopy was normal] : Patient reported colonoscopy was normal [HIV Test offered] : HIV Test offered [Hepatitis C test offered] : Hepatitis C test offered [None] : None [Alone] : lives alone [Employed] : employed [College] : College [] :  [Feels Safe at Home] : Feels safe at home [Fully functional (bathing, dressing, toileting, transferring, walking, feeding)] : Fully functional (bathing, dressing, toileting, transferring, walking, feeding) [Fully functional (using the telephone, shopping, preparing meals, housekeeping, doing laundry, using] : Fully functional and needs no help or supervision to perform IADLs (using the telephone, shopping, preparing meals, housekeeping, doing laundry, using transportation, managing medications and managing finances) [Smoke Detector] : smoke detector [Carbon Monoxide Detector] : carbon monoxide detector [Safety elements used in home] : safety elements used in home [Seat Belt] :  uses seat belt [Sunscreen] : uses sunscreen [With Patient/Caregiver] : , with patient/caregiver [Reviewed no changes] : Reviewed, no changes [Name: ___] : Health Care Proxy's Name: [unfilled]  [Relationship: ___] : Relationship: [unfilled] [Aggressive treatment] : aggressive treatment [de-identified] : wine [EAE5Gpnxi] : 0 [Change in mental status noted] : No change in mental status noted [Language] : denies difficulty with language [Behavior] : denies difficulty with behavior [Learning/Retaining New Information] : denies difficulty learning/retaining new information [Handling Complex Tasks] : denies difficulty handling complex tasks [Reasoning] : denies difficulty with reasoning [Spatial Ability and Orientation] : denies difficulty with spatial ability and orientation [Sexually Active] : not sexually active [High Risk Behavior] : no high risk behavior [Reports changes in hearing] : Reports no changes in hearing [Reports changes in vision] : Reports no changes in vision [Reports changes in dental health] : Reports no changes in dental health [Guns at Home] : no guns at home [MammogramDate] : 2021 [PapSmearDate] : 2021 [BoneDensityDate] : 2017 [ColonoscopyDate] : 2018 [ColonoscopyComments] : 2023 next due [HIVDate] : 12/2021 [HIVComments] : negative [HepatitisCDate] : 12/2021 [HepatitisCComments] : negative [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

## 2022-01-18 ENCOUNTER — NON-APPOINTMENT (OUTPATIENT)
Age: 67
End: 2022-01-18

## 2022-01-28 ENCOUNTER — RESULT REVIEW (OUTPATIENT)
Age: 67
End: 2022-01-28

## 2022-01-28 ENCOUNTER — APPOINTMENT (OUTPATIENT)
Dept: MAMMOGRAPHY | Facility: HOSPITAL | Age: 67
End: 2022-01-28
Payer: MEDICARE

## 2022-01-28 ENCOUNTER — OUTPATIENT (OUTPATIENT)
Dept: OUTPATIENT SERVICES | Facility: HOSPITAL | Age: 67
LOS: 1 days | End: 2022-01-28
Payer: MEDICARE

## 2022-01-28 ENCOUNTER — APPOINTMENT (OUTPATIENT)
Dept: ULTRASOUND IMAGING | Facility: HOSPITAL | Age: 67
End: 2022-01-28
Payer: MEDICARE

## 2022-01-28 ENCOUNTER — APPOINTMENT (OUTPATIENT)
Dept: CT IMAGING | Facility: HOSPITAL | Age: 67
End: 2022-01-28
Payer: MEDICARE

## 2022-01-28 DIAGNOSIS — R09.81 NASAL CONGESTION: ICD-10-CM

## 2022-01-28 PROCEDURE — 77067 SCR MAMMO BI INCL CAD: CPT | Mod: 26

## 2022-01-28 PROCEDURE — 70486 CT MAXILLOFACIAL W/O DYE: CPT | Mod: ME

## 2022-01-28 PROCEDURE — 77063 BREAST TOMOSYNTHESIS BI: CPT

## 2022-01-28 PROCEDURE — 77063 BREAST TOMOSYNTHESIS BI: CPT | Mod: 26

## 2022-01-28 PROCEDURE — 76641 ULTRASOUND BREAST COMPLETE: CPT | Mod: 26,50

## 2022-01-28 PROCEDURE — G1004: CPT

## 2022-01-28 PROCEDURE — 70486 CT MAXILLOFACIAL W/O DYE: CPT | Mod: 26,ME

## 2022-01-28 PROCEDURE — 76641 ULTRASOUND BREAST COMPLETE: CPT

## 2022-01-28 PROCEDURE — 77067 SCR MAMMO BI INCL CAD: CPT

## 2022-02-04 ENCOUNTER — NON-APPOINTMENT (OUTPATIENT)
Age: 67
End: 2022-02-04

## 2022-02-07 ENCOUNTER — APPOINTMENT (OUTPATIENT)
Dept: OTOLARYNGOLOGY | Facility: CLINIC | Age: 67
End: 2022-02-07
Payer: MEDICARE

## 2022-02-07 VITALS
DIASTOLIC BLOOD PRESSURE: 90 MMHG | BODY MASS INDEX: 25.33 KG/M2 | HEART RATE: 74 BPM | OXYGEN SATURATION: 98 % | SYSTOLIC BLOOD PRESSURE: 150 MMHG | TEMPERATURE: 97.3 F | HEIGHT: 65 IN | WEIGHT: 152 LBS

## 2022-02-07 DIAGNOSIS — J34.3 HYPERTROPHY OF NASAL TURBINATES: ICD-10-CM

## 2022-02-07 DIAGNOSIS — J34.2 DEVIATED NASAL SEPTUM: ICD-10-CM

## 2022-02-07 PROCEDURE — 99213 OFFICE O/P EST LOW 20 MIN: CPT

## 2022-02-07 NOTE — REVIEW OF SYSTEMS
[Negative] : Heme/Lymph [Post Nasal Drip] : post nasal drip [Nasal Congestion] : nasal congestion [Recurrent Sinus Infections] : recurrent sinus infections [Sinus Pressure] : sinus pressure [Discolored Nasal Discharge] : discolored nasal discharge

## 2022-02-07 NOTE — HISTORY OF PRESENT ILLNESS
[Nasal Congestion] : nasal congestion [de-identified] : Ms. ORTEGA is a 66 year female who was last evaluated in clinic for a suspected sinus infection refractory to a course of Abx and Antihistamines. She was asked to continue with her recently prescribed Flonase. Today she endorses only transient improvement in her sxs after Flonase use especially after hot showers, with her congestion recurring the next day. She was sent for CT of the sinuses which revealed enlarged turbinates c/f allergies.  [Facial Pain] : no facial pain [Headache] : no headache [Clear Rhinorrhea] : no clear rhinorrhea [Facial Pressure] : no facial pressure [Postnasal Drainage] : no postnasal drainage [Cough] : no cough

## 2022-02-07 NOTE — ASSESSMENT
[FreeTextEntry1] : Pt sxs and CT findings are more c/w allergic rhinitis. Pt to continue to use Fluticasone spray.\par \par Pt to undergo an allergy evaluation and will continue on Fluticasone spray for now.

## 2022-02-07 NOTE — CONSULT LETTER
[Dear  ___] : Dear  [unfilled], [Courtesy Letter:] : I had the pleasure of seeing your patient, [unfilled], in my office today. [Please see my note below.] : Please see my note below. [Consult Closing:] : Thank you very much for allowing me to participate in the care of this patient.  If you have any questions, please do not hesitate to contact me. [Sincerely,] : Sincerely, [FreeTextEntry2] : Juan Hogan MD ([facility]) [FreeTextEntry3] : Jose Angel Berrios MD, FACS\par Chief of Otolaryngology [facility]\par  - Dept. of Otolaryngology\par [facility] [employer]\par \par

## 2022-02-07 NOTE — PHYSICAL EXAM
[Midline] : trachea located in midline position [Normal] : no rashes [] : septum deviated to the right [de-identified] : +torus mandibularis [de-identified] : +torus palatinus

## 2022-02-07 NOTE — DATA REVIEWED
Called and left a message this am.
LM for patient to CB 8/31/17 525pm.
WANTS TO TALK TO EITHER YOU OR DR Jasmin Arevalo ABOUT A BLOOD TEST
[de-identified] : CT Sinuses - Images reviewed with pt.  Sinuses are clear.  S-shaped NSD present.  Turbs hypertrophic.

## 2022-02-22 ENCOUNTER — RX RENEWAL (OUTPATIENT)
Age: 67
End: 2022-02-22

## 2022-03-07 ENCOUNTER — APPOINTMENT (OUTPATIENT)
Dept: PEDIATRIC ALLERGY IMMUNOLOGY | Facility: CLINIC | Age: 67
End: 2022-03-07
Payer: MEDICARE

## 2022-03-07 VITALS
DIASTOLIC BLOOD PRESSURE: 80 MMHG | HEART RATE: 100 BPM | BODY MASS INDEX: 25.66 KG/M2 | OXYGEN SATURATION: 98 % | SYSTOLIC BLOOD PRESSURE: 130 MMHG | WEIGHT: 154 LBS | HEIGHT: 65 IN | TEMPERATURE: 97.2 F

## 2022-03-07 DIAGNOSIS — Z83.6 FAMILY HISTORY OF OTHER DISEASES OF THE RESPIRATORY SYSTEM: ICD-10-CM

## 2022-03-07 DIAGNOSIS — J31.0 CHRONIC RHINITIS: ICD-10-CM

## 2022-03-07 PROCEDURE — 95004 PERQ TESTS W/ALRGNC XTRCS: CPT

## 2022-03-07 PROCEDURE — 99203 OFFICE O/P NEW LOW 30 MIN: CPT | Mod: 25

## 2022-03-10 PROBLEM — J31.0 NONALLERGIC RHINITIS: Status: ACTIVE | Noted: 2022-03-10

## 2022-03-10 NOTE — PHYSICAL EXAM
[Alert] : alert [Well Nourished] : well nourished [Healthy Appearance] : healthy appearance [No Acute Distress] : no acute distress [Well Developed] : well developed [Normal Pupil & Iris Size/Symmetry] : normal pupil and iris size and symmetry [No Discharge] : no discharge [No Photophobia] : no photophobia [Sclera Not Icteric] : sclera not icteric [Normal TMs] : both tympanic membranes were normal [Normal Lips/Tongue] : the lips and tongue were normal [Normal Nasal Mucosa] : the nasal mucosa was normal [Normal Outer Ear/Nose] : the ears and nose were normal in appearance [Normal Tonsils] : normal tonsils [No Thrush] : no thrush [Supple] : the neck was supple [Normal Rate and Effort] : normal respiratory rhythm and effort [No Crackles] : no crackles [No Retractions] : no retractions [Bilateral Audible Breath Sounds] : bilateral audible breath sounds [Normal Rate] : heart rate was normal  [Normal S1, S2] : normal S1 and S2 [No murmur] : no murmur [Regular Rhythm] : with a regular rhythm [Soft] : abdomen soft [Not Tender] : non-tender [Not Distended] : not distended [No HSM] : no hepato-splenomegaly [Normal Cervical Lymph Nodes] : cervical [Skin Intact] : skin intact  [No Rash] : no rash [No Skin Lesions] : no skin lesions [No clubbing] : no clubbing [No Edema] : no edema [No Cyanosis] : no cyanosis [Normal Mood] : mood was normal [Normal Affect] : affect was normal [Alert, Awake, Oriented as Age-Appropriate] : alert, awake, oriented as age appropriate [Pale mucosa] : no pale mucosa

## 2022-03-10 NOTE — HISTORY OF PRESENT ILLNESS
[de-identified] : Ms. Landaverde is a 66 year old woman with a history of sinus issues who presents for initial allergy evaluation.\par \par Was taking antibiotics a few times a year for presumed sinusitis. Referred to ENT. Had a CT scan of her sinuses. \par Taking daily flonase\par Symptoms  are usually worst in the AM and then subside over the course of the day. \par She has nasal congestion, blows her nose, takes a shower and feels better. Sx come and go over the course of the day .\par Also has cough from time to time - expectorates some sputum.\par \par Takes losartan for HTN. No other medical problems.

## 2022-03-10 NOTE — REVIEW OF SYSTEMS
[Nl] : Genitourinary [Immunizations are up to date] : Immunizations are up to date [Received Influenza Vaccine this Past Year] : patient has received the Influenza vaccine this past year [FreeTextEntry1] : s/p COVID vaccine x 3 + booster

## 2022-03-10 NOTE — IMPRESSION
[Allergy Testing Dust Mite] : dust mites [Allergy Testing Mixed Feathers] : feathers [Allergy Testing Dog] : dog [Allergy Testing Cockroach] : cockroach [Allergy Testing Cat] : cat [] : molds [Allergy Testing Trees] : trees [Allergy Testing Weeds] : weeds [Allergy Testing Grasses] : grasses

## 2022-03-10 NOTE — SOCIAL HISTORY
[House] : [unfilled] lives in a house  [Bedroom] :  in bedroom [None] : none [de-identified] :  is  [Smokers in Household] : there are no smokers in the home [FreeTextEntry2] : works for [employer]

## 2022-03-10 NOTE — CONSULT LETTER
[Dear  ___] : Dear  [unfilled], [Consult Letter:] : I had the pleasure of evaluating your patient, [unfilled]. [Please see my note below.] : Please see my note below. [Sincerely,] : Sincerely, [Consult Closing:] : Thank you very much for allowing me to participate in the care of this patient.  If you have any questions, please do not hesitate to contact me. [FreeTextEntry2] : Jose Angel Berrios MD [FreeTextEntry3] : Helena Diehl MD\par Attending Physician \par Division of Allergy/Immunology \par [facility] \par \par  of Medicine and Pediatrics\par [facility] \par \par [address]\[place] [address]\par Tel: [phone]\par Fax: [phone]\par Email: [email or web address]\par \par \par \par

## 2022-04-11 PROBLEM — Z11.59 SCREENING FOR VIRAL DISEASE: Status: ACTIVE | Noted: 2020-12-09

## 2022-05-03 ENCOUNTER — RX RENEWAL (OUTPATIENT)
Age: 67
End: 2022-05-03

## 2022-05-14 ENCOUNTER — NON-APPOINTMENT (OUTPATIENT)
Age: 67
End: 2022-05-14

## 2022-07-05 ENCOUNTER — NON-APPOINTMENT (OUTPATIENT)
Age: 67
End: 2022-07-05

## 2022-07-05 ENCOUNTER — RX RENEWAL (OUTPATIENT)
Age: 67
End: 2022-07-05

## 2022-07-06 ENCOUNTER — APPOINTMENT (OUTPATIENT)
Dept: FAMILY MEDICINE | Facility: CLINIC | Age: 67
End: 2022-07-06

## 2022-07-06 PROCEDURE — 99442: CPT | Mod: 95

## 2022-07-06 NOTE — HISTORY OF PRESENT ILLNESS
[Home] : at home, [unfilled] , at the time of the visit. [Medical Office: ([facility])___] : at the medical office located in  [Verbal consent obtained from patient] : the patient, [unfilled] [FreeTextEntry1] : Ms Marielena Landaverde is a 66 yo female presents today via telephonic visit states this morning tested postive for COVID-19 states had stuffy nose, congestion, headache that started 2 days ago. Pt reports she did home antigen test, noted 1 line. Advised pt 1 line detected refers to CONTROL Test and she is NOT positive. Advised if still concerned then go to nearest Urgent care get COVID-19 Rapid PCR swab, and recheck if positive for COVID-19. if positive can get treatment with Paxlovid.  [Time Spent: ___ minutes] : I have spent [unfilled] minutes with the patient on the telephone

## 2022-07-12 ENCOUNTER — APPOINTMENT (OUTPATIENT)
Dept: FAMILY MEDICINE | Facility: CLINIC | Age: 67
End: 2022-07-12

## 2022-07-12 PROCEDURE — 99442: CPT | Mod: CS,95

## 2022-07-12 NOTE — HISTORY OF PRESENT ILLNESS
[Home] : at home, [unfilled] , at the time of the visit. [Medical Office: ([facility])___] : at the medical office located in  [Verbal consent obtained from patient] : the patient, [unfilled] [FreeTextEntry1] : Ms Marielena Landaverde is a 68 yo female presents today via telehealth to follow up since last TTM visit. Pt reports she did go to [facility], last Wednesday and tested positive for Rapid COVID-19 PCR. Pt since then was prescribed Paxlovid which she reports she completed yesterday. Pt reports today noticing still lingering congestion, and gradually improving but not 100%. Pt seeks a letter today for work so she could recover further at home. Provided letter today.

## 2022-07-18 ENCOUNTER — APPOINTMENT (OUTPATIENT)
Dept: FAMILY MEDICINE | Facility: CLINIC | Age: 67
End: 2022-07-18

## 2022-07-18 VITALS
SYSTOLIC BLOOD PRESSURE: 150 MMHG | DIASTOLIC BLOOD PRESSURE: 80 MMHG | BODY MASS INDEX: 26.49 KG/M2 | OXYGEN SATURATION: 99 % | TEMPERATURE: 96.9 F | WEIGHT: 159 LBS | HEIGHT: 65 IN | RESPIRATION RATE: 15 BRPM | HEART RATE: 97 BPM

## 2022-07-18 DIAGNOSIS — R05.8 OTHER SPECIFIED COUGH: ICD-10-CM

## 2022-07-18 DIAGNOSIS — U07.1 COVID-19: ICD-10-CM

## 2022-07-18 PROCEDURE — 99214 OFFICE O/P EST MOD 30 MIN: CPT | Mod: CS

## 2022-07-18 NOTE — PHYSICAL EXAM
[Well Nourished] : well nourished [EOMI] : extraocular movements intact [Supple] : supple [Clear to Auscultation] : lungs were clear to auscultation bilaterally [Normal S1, S2] : normal S1 and S2 [No Edema] : there was no peripheral edema [Soft] : abdomen soft [Non Tender] : non-tender [No Rash] : no rash [Normal Gait] : normal gait [Normal Affect] : the affect was normal [de-identified] : mildly edematous nasal turbinates

## 2022-07-18 NOTE — REVIEW OF SYSTEMS
[Shortness Of Breath] : no shortness of breath [Wheezing] : no wheezing [Cough] : cough [Negative] : Heme/Lymph [FreeTextEntry6] : congestion

## 2022-07-18 NOTE — ASSESSMENT
[FreeTextEntry1] : Symptomatic management, plenty of rest, warm fluids, hand washing, salt water gargle, for now, Flonase, promethazine DM,  tylenol for fever, if symptoms worsen, new onset, fever, chills, return to office for re-evaluation, may consider referral to Pulmonary

## 2022-07-18 NOTE — HISTORY OF PRESENT ILLNESS
[FreeTextEntry8] : Ms Marielena Lanadverde is a 66 yo female presents today returns post COVID-19 infection, still with lingering cough,most congestion clear mucus and now extreme fatigue. Pt claims still not ready to return to work. Pt seeks new letter for work for an additional week to recover. Pt provided till next Monday, advised if not improving even after than will need to be seen by pulmonologist. Pt advised to continue with symptomatic management, plenty of rest, warm fluids, hand washing, salt water gargle, for now, Flonase, promethazine DM,  tylenol for fever, if symptoms worsen, new onset, fever, chills, return to office for re-evaluation.

## 2022-07-22 ENCOUNTER — APPOINTMENT (OUTPATIENT)
Dept: FAMILY MEDICINE | Facility: CLINIC | Age: 67
End: 2022-07-22

## 2022-07-22 VITALS
WEIGHT: 156 LBS | OXYGEN SATURATION: 97 % | SYSTOLIC BLOOD PRESSURE: 140 MMHG | DIASTOLIC BLOOD PRESSURE: 80 MMHG | HEART RATE: 87 BPM | BODY MASS INDEX: 25.99 KG/M2 | TEMPERATURE: 97.7 F | RESPIRATION RATE: 15 BRPM | HEIGHT: 65 IN

## 2022-07-22 DIAGNOSIS — J32.9 CHRONIC SINUSITIS, UNSPECIFIED: ICD-10-CM

## 2022-07-22 PROCEDURE — 99213 OFFICE O/P EST LOW 20 MIN: CPT

## 2022-07-22 NOTE — HISTORY OF PRESENT ILLNESS
[FreeTextEntry8] : Has been suffering from persistent nasal congestion, runny nose, and cough for 2 weeks.  Initially diagnosed with COVID and was treated with Paxilovid.  Symptoms have not improved, and has acutely worsened over the past 2 days.  Denies any shortness of breath, wheezing, or chest pain.  Has not responded to Promethazine DM.\par \par \par \par \par \par \par

## 2022-07-22 NOTE — PHYSICAL EXAM
[No Acute Distress] : no acute distress [Well Nourished] : well nourished [Well Developed] : well developed [Well-Appearing] : well-appearing [Normal Sclera/Conjunctiva] : normal sclera/conjunctiva [PERRL] : pupils equal round and reactive to light [EOMI] : extraocular movements intact [Normal Outer Ear/Nose] : the outer ears and nose were normal in appearance [Normal Oropharynx] : the oropharynx was normal [No JVD] : no jugular venous distention [No Lymphadenopathy] : no lymphadenopathy [Supple] : supple [Thyroid Normal, No Nodules] : the thyroid was normal and there were no nodules present [No Respiratory Distress] : no respiratory distress  [No Accessory Muscle Use] : no accessory muscle use [Clear to Auscultation] : lungs were clear to auscultation bilaterally [Normal Rate] : normal rate  [Regular Rhythm] : with a regular rhythm [Normal S1, S2] : normal S1 and S2 [No Murmur] : no murmur heard [No Carotid Bruits] : no carotid bruits [No Abdominal Bruit] : a ~M bruit was not heard ~T in the abdomen [No Varicosities] : no varicosities [Pedal Pulses Present] : the pedal pulses are present [No Edema] : there was no peripheral edema [No Palpable Aorta] : no palpable aorta [No Extremity Clubbing/Cyanosis] : no extremity clubbing/cyanosis [Soft] : abdomen soft [Non Tender] : non-tender [Non-distended] : non-distended [No Masses] : no abdominal mass palpated [No HSM] : no HSM [Normal Bowel Sounds] : normal bowel sounds [Normal Posterior Cervical Nodes] : no posterior cervical lymphadenopathy [Normal Anterior Cervical Nodes] : no anterior cervical lymphadenopathy [No CVA Tenderness] : no CVA  tenderness [No Spinal Tenderness] : no spinal tenderness [No Joint Swelling] : no joint swelling [Grossly Normal Strength/Tone] : grossly normal strength/tone [No Rash] : no rash [Coordination Grossly Intact] : coordination grossly intact [No Focal Deficits] : no focal deficits [Normal Gait] : normal gait [Deep Tendon Reflexes (DTR)] : deep tendon reflexes were 2+ and symmetric [Normal Affect] : the affect was normal [Normal Insight/Judgement] : insight and judgment were intact [de-identified] : Injected nasal turbinates, and posterior pharynx

## 2022-08-13 ENCOUNTER — RX RENEWAL (OUTPATIENT)
Age: 67
End: 2022-08-13

## 2022-08-30 ENCOUNTER — RX RENEWAL (OUTPATIENT)
Age: 67
End: 2022-08-30

## 2022-11-08 ENCOUNTER — APPOINTMENT (OUTPATIENT)
Dept: FAMILY MEDICINE | Facility: CLINIC | Age: 67
End: 2022-11-08

## 2022-11-08 VITALS
HEIGHT: 65 IN | SYSTOLIC BLOOD PRESSURE: 154 MMHG | HEART RATE: 94 BPM | TEMPERATURE: 96.9 F | OXYGEN SATURATION: 99 % | WEIGHT: 159.13 LBS | DIASTOLIC BLOOD PRESSURE: 92 MMHG | BODY MASS INDEX: 26.51 KG/M2 | RESPIRATION RATE: 16 BRPM

## 2022-11-08 PROCEDURE — 99214 OFFICE O/P EST MOD 30 MIN: CPT

## 2022-11-08 NOTE — HISTORY OF PRESENT ILLNESS
[Cold Symptoms] : cold symptoms [Moderate] : moderate [___ Days ago] : [unfilled] days ago [Constant] : constant [Congestion] : congestion [Cough] : cough [Sore Throat] : no sore throat [Wheezing] : no wheezing [Chills] : no chills [Anorexia] : no anorexia [Shortness Of Breath] : no shortness of breath [Earache] : no earache [Fatigue] : not fatigue [Headache] : no headache [Fever] : no fever [At Night] : at night [In Morning] : in the morning [Worsening] : worsening [FreeTextEntry8] : Ms Marielena Landaverde is a 68 yo female presents today with symptoms of with lingering cough,most congestion clear mucus and fatigue. Pt reports grand children sick now on antibiotics after a week of persistent symptoms. Pt tested for COVID-19 which was negative. Pt advised to continue with symptomatic management, plenty of rest, warm fluids, hand washing, salt water gargle, for now, Flonase, promethazine DM,  tylenol for fever, if symptoms worsen, new onset, fever, chills, symptoms persisting greater than a week then call or return to office.

## 2022-11-08 NOTE — ASSESSMENT
[FreeTextEntry1] : Symptomatic management, plenty of rest, warm fluids, hand washing, salt water gargle, for now, Flonase, promethazine DM,  tylenol for fever, if symptoms worsen, new onset, fever, chills, return to office for re-evaluation.

## 2022-11-08 NOTE — PHYSICAL EXAM
[Well Nourished] : well nourished [EOMI] : extraocular movements intact [Supple] : supple [Clear to Auscultation] : lungs were clear to auscultation bilaterally [Normal S1, S2] : normal S1 and S2 [No Edema] : there was no peripheral edema [Soft] : abdomen soft [Non Tender] : non-tender [No Rash] : no rash [Normal Gait] : normal gait [Normal Affect] : the affect was normal [de-identified] : mildly edematous nasal turbinates

## 2022-11-15 DIAGNOSIS — J20.9 ACUTE BRONCHITIS, UNSPECIFIED: ICD-10-CM

## 2022-12-21 ENCOUNTER — RX RENEWAL (OUTPATIENT)
Age: 67
End: 2022-12-21

## 2022-12-26 ENCOUNTER — RX RENEWAL (OUTPATIENT)
Age: 67
End: 2022-12-26

## 2023-01-03 ENCOUNTER — NON-APPOINTMENT (OUTPATIENT)
Age: 68
End: 2023-01-03

## 2023-01-29 ENCOUNTER — RX RENEWAL (OUTPATIENT)
Age: 68
End: 2023-01-29

## 2023-02-01 ENCOUNTER — APPOINTMENT (OUTPATIENT)
Dept: OBGYN | Facility: CLINIC | Age: 68
End: 2023-02-01
Payer: MEDICARE

## 2023-02-01 VITALS
SYSTOLIC BLOOD PRESSURE: 122 MMHG | HEART RATE: 95 BPM | OXYGEN SATURATION: 98 % | DIASTOLIC BLOOD PRESSURE: 78 MMHG | HEIGHT: 65 IN | TEMPERATURE: 97.4 F | BODY MASS INDEX: 25.33 KG/M2 | WEIGHT: 152 LBS

## 2023-02-01 DIAGNOSIS — I99.9 UNSPECIFIED DISORDER OF CIRCULATORY SYSTEM: ICD-10-CM

## 2023-02-01 DIAGNOSIS — Z01.419 ENCOUNTER FOR GYNECOLOGICAL EXAMINATION (GENERAL) (ROUTINE) W/OUT ABNORMAL FINDINGS: ICD-10-CM

## 2023-02-01 PROCEDURE — G0101: CPT

## 2023-02-01 RX ORDER — AMOXICILLIN AND CLAVULANATE POTASSIUM 875; 125 MG/1; MG/1
875-125 TABLET, COATED ORAL
Qty: 14 | Refills: 0 | Status: DISCONTINUED | COMMUNITY
Start: 2022-11-15 | End: 2023-02-01

## 2023-02-01 RX ORDER — AMOXICILLIN AND CLAVULANATE POTASSIUM 875; 125 MG/1; MG/1
875-125 TABLET, COATED ORAL
Qty: 14 | Refills: 0 | Status: DISCONTINUED | COMMUNITY
Start: 2022-07-22 | End: 2023-02-01

## 2023-02-01 NOTE — PHYSICAL EXAM
[Chaperone Present] : A chaperone was present in the examining room during all aspects of the physical examination [Appropriately responsive] : appropriately responsive [Alert] : alert [No Acute Distress] : no acute distress [No Lymphadenopathy] : no lymphadenopathy [No Murmurs] : no murmurs [Non-tender] : non-tender [Non-distended] : non-distended [No HSM] : No HSM [No Lesions] : no lesions [No Mass] : no mass [Oriented x3] : oriented x3 [Examination Of The Breasts] : a normal appearance [No Discharge] : no discharge [No Masses] : no breast masses were palpable [Labia Majora] : normal [Labia Minora] : normal [No Bleeding] : There was no active vaginal bleeding [Soft] : soft [Normal] : normal [Normal Position] : in a normal position [Tenderness] : nontender [Enlarged ___ wks] : not enlarged [Mass ___ cm] : no uterine mass was palpated [Uterine Adnexae] : normal

## 2023-02-01 NOTE — HISTORY OF PRESENT ILLNESS
[N] : Patient is not sexually active [LMPDate] : age 51 [[facility] 2 [FreeTextEntry1] :  X 2

## 2023-02-03 LAB — HPV HIGH+LOW RISK DNA PNL CVX: NOT DETECTED

## 2023-02-09 LAB — CYTOLOGY CVX/VAG DOC THIN PREP: ABNORMAL

## 2023-02-23 ENCOUNTER — APPOINTMENT (OUTPATIENT)
Dept: RADIOLOGY | Facility: HOSPITAL | Age: 68
End: 2023-02-23
Payer: MEDICARE

## 2023-02-23 ENCOUNTER — OUTPATIENT (OUTPATIENT)
Dept: OUTPATIENT SERVICES | Facility: HOSPITAL | Age: 68
LOS: 1 days | End: 2023-02-23
Payer: MEDICARE

## 2023-02-23 ENCOUNTER — APPOINTMENT (OUTPATIENT)
Dept: FAMILY MEDICINE | Facility: CLINIC | Age: 68
End: 2023-02-23
Payer: MEDICARE

## 2023-02-23 VITALS
HEART RATE: 100 BPM | OXYGEN SATURATION: 98 % | TEMPERATURE: 97.1 F | DIASTOLIC BLOOD PRESSURE: 80 MMHG | BODY MASS INDEX: 25.49 KG/M2 | SYSTOLIC BLOOD PRESSURE: 140 MMHG | RESPIRATION RATE: 17 BRPM | WEIGHT: 153 LBS | HEIGHT: 65 IN

## 2023-02-23 DIAGNOSIS — J30.0 VASOMOTOR RHINITIS: ICD-10-CM

## 2023-02-23 DIAGNOSIS — R09.81 NASAL CONGESTION: ICD-10-CM

## 2023-02-23 DIAGNOSIS — R05.3 CHRONIC COUGH: ICD-10-CM

## 2023-02-23 PROCEDURE — 71046 X-RAY EXAM CHEST 2 VIEWS: CPT | Mod: 26

## 2023-02-23 PROCEDURE — 99214 OFFICE O/P EST MOD 30 MIN: CPT

## 2023-02-23 PROCEDURE — 71046 X-RAY EXAM CHEST 2 VIEWS: CPT

## 2023-02-23 NOTE — PHYSICAL EXAM
[Well Nourished] : well nourished [EOMI] : extraocular movements intact [Supple] : supple [Clear to Auscultation] : lungs were clear to auscultation bilaterally [Normal S1, S2] : normal S1 and S2 [No Edema] : there was no peripheral edema [Soft] : abdomen soft [Non Tender] : non-tender [No Rash] : no rash [Normal Gait] : normal gait [Normal Affect] : the affect was normal [de-identified] : mildly edematous nasal turbinates

## 2023-02-23 NOTE — REVIEW OF SYSTEMS
[Cough] : cough [Negative] : Heme/Lymph [Shortness Of Breath] : no shortness of breath [Wheezing] : no wheezing [FreeTextEntry6] : congestion

## 2023-02-23 NOTE — ASSESSMENT
[FreeTextEntry1] : Symptomatic management, plenty of rest, warm fluids, hand washing, salt water gargle, for now, dymysta  tylenol for fever, if symptoms worsen, new onset, fever, chills, return to office for re-evaluation.\par CXR for persistent cough\par

## 2023-02-23 NOTE — HISTORY OF PRESENT ILLNESS
[Cold Symptoms] : cold symptoms [Moderate] : moderate [___ Days ago] : [unfilled] days ago [Gradual] : gradually [Constant] : constant [Congestion] : congestion [Cough] : cough [In Morning] : in the morning [Worsening] : worsening [Sore Throat] : no sore throat [Wheezing] : no wheezing [Chills] : no chills [Anorexia] : no anorexia [Shortness Of Breath] : no shortness of breath [Earache] : no earache [Fatigue] : not fatigue [Headache] : no headache [Fever] : no fever [FreeTextEntry5] : flonase, citerizine [FreeTextEntry8] : Ms Marielena Landaverde is a 66 yo female presents today for symptoms listed below. Pt reports she always has congestion in the mornings, and lately about a day now experiencing cough that is settling in her chest. Pt also report scratchy throat, red eyes, and also lingering cough. Advised today will also one CXR to evaluate need for antibiotics.

## 2023-02-27 DIAGNOSIS — R05.3 CHRONIC COUGH: ICD-10-CM

## 2023-02-27 DIAGNOSIS — R09.81 NASAL CONGESTION: ICD-10-CM

## 2023-02-28 ENCOUNTER — APPOINTMENT (OUTPATIENT)
Dept: FAMILY MEDICINE | Facility: CLINIC | Age: 68
End: 2023-02-28

## 2023-03-06 ENCOUNTER — APPOINTMENT (OUTPATIENT)
Dept: FAMILY MEDICINE | Facility: CLINIC | Age: 68
End: 2023-03-06

## 2023-03-20 ENCOUNTER — APPOINTMENT (OUTPATIENT)
Dept: OTOLARYNGOLOGY | Facility: CLINIC | Age: 68
End: 2023-03-20

## 2023-03-21 NOTE — ED ADULT NURSE NOTE - HIV OFFER
Neck Incubation Time: 1 Hour Feet Incubation Time: 2 Hours Consent: The procedure and risks were reviewed with the patient including but not limited to: burning, pigmentary changes, pain, blistering, scabbing, redness, and the possibility of needing numerous treatments. Strict photoprotection after the procedure was also discussed. Frequency Of Pdt: Single Treatment Debridement: No Pdt Type: SAUL-U Detail Level: Simple Location: Scalp Face And Scalp Incubation Time: 1 Hour for the face and 2 Hours for the scalp Photosensitizer: Levulan Opt out

## 2023-03-24 ENCOUNTER — RESULT REVIEW (OUTPATIENT)
Age: 68
End: 2023-03-24

## 2023-03-24 ENCOUNTER — OUTPATIENT (OUTPATIENT)
Dept: OUTPATIENT SERVICES | Facility: HOSPITAL | Age: 68
LOS: 1 days | End: 2023-03-24
Payer: MEDICARE

## 2023-03-24 ENCOUNTER — APPOINTMENT (OUTPATIENT)
Dept: ULTRASOUND IMAGING | Facility: HOSPITAL | Age: 68
End: 2023-03-24
Payer: MEDICARE

## 2023-03-24 ENCOUNTER — APPOINTMENT (OUTPATIENT)
Dept: MAMMOGRAPHY | Facility: HOSPITAL | Age: 68
End: 2023-03-24
Payer: MEDICARE

## 2023-03-24 DIAGNOSIS — Z01.419 ENCOUNTER FOR GYNECOLOGICAL EXAMINATION (GENERAL) (ROUTINE) WITHOUT ABNORMAL FINDINGS: ICD-10-CM

## 2023-03-24 PROCEDURE — 76641 ULTRASOUND BREAST COMPLETE: CPT | Mod: 26,50,GA

## 2023-03-24 PROCEDURE — 76641 ULTRASOUND BREAST COMPLETE: CPT

## 2023-03-24 PROCEDURE — 77067 SCR MAMMO BI INCL CAD: CPT | Mod: 26

## 2023-03-24 PROCEDURE — 77063 BREAST TOMOSYNTHESIS BI: CPT | Mod: 26

## 2023-03-24 PROCEDURE — 77067 SCR MAMMO BI INCL CAD: CPT

## 2023-03-24 PROCEDURE — 77063 BREAST TOMOSYNTHESIS BI: CPT

## 2023-03-28 NOTE — ED PROVIDER NOTE - OBJECTIVE STATEMENT
Pt is 66 y/o female with PMHx of HTN (not on medication currently, previously on Losartan) here for eval of elevated BP. Pt has been asymptomatic but admits that she has been under a lot of stress secondary to loosing her  to cancer 8 months ago, was "advised by a friend to start checking her blood pressure". Pt went to [facility] as at home her systolic BP was in 150's, and the readings in the clinic were 190/100 and 170/86 therefor pt was sent to er for further eval. Pt denies HA, dizziness, denies cp, sob, abd pain, n/v/d. denies photophobia, neck pain/stiffness, has no pcp. Denies SI/HI, denies depressed mood, hallucinations. warm and dry Pt is 64 y/o female with PMHx of HTN (not on medication currently, previously on Losartan) here for eval of elevated BP. Pt has been asymptomatic but admits that she has been under a lot of stress secondary to loosing her  to cancer 8 months ago, was "advised by a friend to start checking her blood pressure". Pt went to [facility] as at home her systolic BP was in 150's, and the readings in the clinic were 190/100 and 170/86 therefor pt was sent to er for further eval. Pt denies HA, dizziness, denies cp, sob, abd pain, n/v/d. denies photophobia, neck pain/stiffness, has no PCP. Denies SI/HI, denies depressed mood, hallucinations.

## 2023-04-18 ENCOUNTER — APPOINTMENT (OUTPATIENT)
Dept: INTERNAL MEDICINE | Facility: CLINIC | Age: 68
End: 2023-04-18
Payer: MEDICARE

## 2023-04-18 VITALS
BODY MASS INDEX: 24.16 KG/M2 | RESPIRATION RATE: 16 BRPM | HEIGHT: 65 IN | TEMPERATURE: 97.4 F | SYSTOLIC BLOOD PRESSURE: 122 MMHG | WEIGHT: 145 LBS | OXYGEN SATURATION: 98 % | DIASTOLIC BLOOD PRESSURE: 82 MMHG | HEART RATE: 91 BPM

## 2023-04-18 DIAGNOSIS — Z12.11 ENCOUNTER FOR SCREENING FOR MALIGNANT NEOPLASM OF COLON: ICD-10-CM

## 2023-04-18 DIAGNOSIS — K21.9 GASTRO-ESOPHAGEAL REFLUX DISEASE W/OUT ESOPHAGITIS: ICD-10-CM

## 2023-04-18 PROCEDURE — 99213 OFFICE O/P EST LOW 20 MIN: CPT

## 2023-04-18 RX ORDER — AZELASTINE HYDROCHLORIDE AND FLUTICASONE PROPIONATE 137; 50 UG/1; UG/1
137-50 SPRAY, METERED NASAL
Qty: 1 | Refills: 0 | Status: DISCONTINUED | COMMUNITY
Start: 2023-02-23 | End: 2023-04-18

## 2023-04-18 RX ORDER — PROMETHAZINE HYDROCHLORIDE AND DEXTROMETHORPHAN HYDROBROMIDE ORAL SOLUTION 15; 6.25 MG/5ML; MG/5ML
6.25-15 SOLUTION ORAL
Qty: 1 | Refills: 1 | Status: DISCONTINUED | COMMUNITY
Start: 2022-07-18 | End: 2023-04-18

## 2023-04-18 RX ORDER — ZOSTER VACCINE RECOMBINANT, ADJUVANTED 50 MCG/0.5
50 KIT INTRAMUSCULAR
Qty: 1 | Refills: 0 | Status: DISCONTINUED | COMMUNITY
Start: 2022-01-03 | End: 2023-04-18

## 2023-04-18 RX ORDER — DOXYCYCLINE HYCLATE 100 MG/1
100 CAPSULE ORAL
Qty: 14 | Refills: 0 | Status: DISCONTINUED | COMMUNITY
Start: 2023-02-27 | End: 2023-04-18

## 2023-04-18 RX ORDER — CETIRIZINE HYDROCHLORIDE 10 MG/1
10 TABLET, COATED ORAL
Qty: 30 | Refills: 4 | Status: DISCONTINUED | COMMUNITY
Start: 2022-03-07 | End: 2023-04-18

## 2023-04-18 RX ORDER — FLUTICASONE PROPIONATE 50 UG/1
50 SPRAY, METERED NASAL
Qty: 16 | Refills: 1 | Status: DISCONTINUED | COMMUNITY
Start: 2021-12-09 | End: 2023-04-18

## 2023-04-29 PROBLEM — Z12.11 ENCOUNTER FOR SCREENING COLONOSCOPY: Status: RESOLVED | Noted: 2023-04-29 | Resolved: 2023-05-13

## 2023-04-29 NOTE — HISTORY OF PRESENT ILLNESS
[FreeTextEntry1] : 67-year-old woman comes to the office for gastroenterology consultation referred by her primary care physician Dr. walker to schedule a screening colonoscopy and endoscopy [de-identified] : Comes to the office for gastroenterology consultation referred by her PCP to schedule a screening colonoscopy and upper endoscopy patient's last colonoscopy was performed by myself in 2018 her past history is significant forHypertension and GERD review of systems is unremarkable except for GERD without dysphagia has no abdominal pains nausea vomiting diarrhea constipation bright red blood per rectum or black stools appetite has been good weight has been stable

## 2023-04-29 NOTE — PHYSICAL EXAM
[No Acute Distress] : no acute distress [Well Nourished] : well nourished [Well-Appearing] : well-appearing [Well Developed] : well developed [Normal Sclera/Conjunctiva] : normal sclera/conjunctiva [PERRL] : pupils equal round and reactive to light [EOMI] : extraocular movements intact [Normal Outer Ear/Nose] : the outer ears and nose were normal in appearance [Normal Oropharynx] : the oropharynx was normal [No JVD] : no jugular venous distention [No Lymphadenopathy] : no lymphadenopathy [Supple] : supple [No Respiratory Distress] : no respiratory distress  [Thyroid Normal, No Nodules] : the thyroid was normal and there were no nodules present [No Accessory Muscle Use] : no accessory muscle use [Clear to Auscultation] : lungs were clear to auscultation bilaterally [Normal Rate] : normal rate  [Regular Rhythm] : with a regular rhythm [Normal S1, S2] : normal S1 and S2 [No Carotid Bruits] : no carotid bruits [No Murmur] : no murmur heard [No Abdominal Bruit] : a ~M bruit was not heard ~T in the abdomen [No Varicosities] : no varicosities [Pedal Pulses Present] : the pedal pulses are present [No Edema] : there was no peripheral edema [No Palpable Aorta] : no palpable aorta [No Extremity Clubbing/Cyanosis] : no extremity clubbing/cyanosis [Soft] : abdomen soft [Non Tender] : non-tender [Non-distended] : non-distended [No Masses] : no abdominal mass palpated [No HSM] : no HSM [Normal Bowel Sounds] : normal bowel sounds [Normal Posterior Cervical Nodes] : no posterior cervical lymphadenopathy [Normal Anterior Cervical Nodes] : no anterior cervical lymphadenopathy [No Spinal Tenderness] : no spinal tenderness [No CVA Tenderness] : no CVA  tenderness [No Joint Swelling] : no joint swelling [Grossly Normal Strength/Tone] : grossly normal strength/tone [No Rash] : no rash [Coordination Grossly Intact] : coordination grossly intact [Normal Gait] : normal gait [No Focal Deficits] : no focal deficits [Deep Tendon Reflexes (DTR)] : deep tendon reflexes were 2+ and symmetric [Normal Affect] : the affect was normal [Normal Insight/Judgement] : insight and judgment were intact

## 2023-04-29 NOTE — ASSESSMENT
[FreeTextEntry1] : Physical examination reveals a well-developed woman in no acute distress blood pressure 122/82 heart rate 91 respirations 16 HEENT was unremarkable chest was clear cardiovascular regular abdomen was soft extremities showed no clubbing cyanosis or edema neurologic exam was nonfocal patient scheduled for a colonoscopy Gatorade MiraLAX preparation was reviewed with the patient and all her questions were answered and endoscopy will be done at the same time patient has been cleared for the procedure by her PCP

## 2023-04-29 NOTE — REVIEW OF SYSTEMS
[Fever] : no fever [Chills] : no chills [Fatigue] : no fatigue [Hot Flashes] : no hot flashes [Night Sweats] : no night sweats [Recent Change In Weight] : ~T no recent weight change [Discharge] : no discharge [Pain] : no pain [Redness] : no redness [Dryness] : no dryness  [Itching] : no itching [Vision Problems] : no vision problems [Earache] : no earache [Hearing Loss] : no hearing loss [Nosebleed] : no nosebleeds [Nasal Discharge] : no nasal discharge [Hoarseness] : no hoarseness [Chest Pain] : no chest pain [Sore Throat] : no sore throat [Palpitations] : no palpitations [Leg Claudication] : no leg claudication [Lower Ext Edema] : no lower extremity edema [Orthopnea] : no orthopnea [Paroxysmal Nocturnal Dyspnea] : no paroxysmal nocturnal dyspnea [Shortness Of Breath] : no shortness of breath [Wheezing] : no wheezing [Dyspnea on Exertion] : no dyspnea on exertion [Cough] : no cough [Abdominal Pain] : no abdominal pain [Nausea] : no nausea [Constipation] : no constipation [Diarrhea] : diarrhea [Vomiting] : no vomiting [Heartburn] : heartburn [Melena] : no melena [Dysuria] : no dysuria [Incontinence] : no incontinence [Poor Libido] : libido not poor [Hematuria] : no hematuria [Frequency] : no frequency [Vaginal Discharge] : no vaginal discharge [Joint Pain] : no joint pain [Joint Stiffness] : no joint stiffness [Joint Swelling] : no joint swelling [Muscle Weakness] : no muscle weakness [Muscle Pain] : no muscle pain [Back Pain] : no back pain [Itching] : no itching [Mole Changes] : no mole changes [Nail Changes] : no nail changes [Skin Rash] : no skin rash [Hair Changes] : no hair changes [Headache] : no headache [Dizziness] : no dizziness [Fainting] : no fainting [Confusion] : no confusion [Memory Loss] : no memory loss [Suicidal] : not suicidal [Insomnia] : no insomnia [Anxiety] : no anxiety [Depression] : no depression [Easy Bleeding] : no easy bleeding [Easy Bruising] : no easy bruising [Swollen Glands] : no swollen glands

## 2023-05-06 ENCOUNTER — INPATIENT (INPATIENT)
Facility: HOSPITAL | Age: 68
LOS: 2 days | Discharge: HOME | DRG: 435 | End: 2023-05-09
Attending: SURGERY | Admitting: SURGERY
Payer: MEDICARE

## 2023-05-06 VITALS
TEMPERATURE: 98 F | HEART RATE: 90 BPM | DIASTOLIC BLOOD PRESSURE: 89 MMHG | SYSTOLIC BLOOD PRESSURE: 160 MMHG | OXYGEN SATURATION: 99 % | RESPIRATION RATE: 18 BRPM | HEIGHT: 65 IN | WEIGHT: 145.06 LBS

## 2023-05-06 DIAGNOSIS — Z90.49 ACQUIRED ABSENCE OF OTHER SPECIFIED PARTS OF DIGESTIVE TRACT: Chronic | ICD-10-CM

## 2023-05-06 DIAGNOSIS — R17 UNSPECIFIED JAUNDICE: ICD-10-CM

## 2023-05-06 LAB
ALBUMIN SERPL ELPH-MCNC: 3.6 G/DL — SIGNIFICANT CHANGE UP (ref 3.3–5)
ALP SERPL-CCNC: 953 U/L — HIGH (ref 40–120)
ALT FLD-CCNC: 567 U/L — HIGH (ref 10–45)
ANION GAP SERPL CALC-SCNC: 15 MMOL/L — SIGNIFICANT CHANGE UP (ref 5–17)
AST SERPL-CCNC: 345 U/L — HIGH (ref 10–40)
BASOPHILS # BLD AUTO: 0.09 K/UL — SIGNIFICANT CHANGE UP (ref 0–0.2)
BASOPHILS NFR BLD AUTO: 1.3 % — SIGNIFICANT CHANGE UP (ref 0–2)
BILIRUB DIRECT SERPL-MCNC: >10 MG/DL — HIGH (ref 0–0.3)
BILIRUB INDIRECT FLD-MCNC: <10.5 MG/DL — HIGH (ref 0.2–1)
BILIRUB SERPL-MCNC: 20.5 MG/DL — HIGH (ref 0.2–1.2)
BILIRUB SERPL-MCNC: 20.5 MG/DL — HIGH (ref 0.2–1.2)
BUN SERPL-MCNC: 10 MG/DL — SIGNIFICANT CHANGE UP (ref 7–23)
CALCIUM SERPL-MCNC: 9.5 MG/DL — SIGNIFICANT CHANGE UP (ref 8.4–10.5)
CHLORIDE SERPL-SCNC: 98 MMOL/L — SIGNIFICANT CHANGE UP (ref 96–108)
CO2 SERPL-SCNC: 21 MMOL/L — LOW (ref 22–31)
CREAT SERPL-MCNC: 0.48 MG/DL — LOW (ref 0.5–1.3)
EGFR: 104 ML/MIN/1.73M2 — SIGNIFICANT CHANGE UP
EOSINOPHIL # BLD AUTO: 0.03 K/UL — SIGNIFICANT CHANGE UP (ref 0–0.5)
EOSINOPHIL NFR BLD AUTO: 0.4 % — SIGNIFICANT CHANGE UP (ref 0–6)
GLUCOSE SERPL-MCNC: 115 MG/DL — HIGH (ref 70–99)
HCT VFR BLD CALC: 33.9 % — LOW (ref 34.5–45)
HGB BLD-MCNC: 11.9 G/DL — SIGNIFICANT CHANGE UP (ref 11.5–15.5)
IMM GRANULOCYTES NFR BLD AUTO: 0.6 % — SIGNIFICANT CHANGE UP (ref 0–0.9)
LIDOCAIN IGE QN: 203 U/L — HIGH (ref 7–60)
LYMPHOCYTES # BLD AUTO: 1.25 K/UL — SIGNIFICANT CHANGE UP (ref 1–3.3)
LYMPHOCYTES # BLD AUTO: 17.9 % — SIGNIFICANT CHANGE UP (ref 13–44)
MCHC RBC-ENTMCNC: 33.1 PG — SIGNIFICANT CHANGE UP (ref 27–34)
MCHC RBC-ENTMCNC: 35.1 GM/DL — SIGNIFICANT CHANGE UP (ref 32–36)
MCV RBC AUTO: 94.4 FL — SIGNIFICANT CHANGE UP (ref 80–100)
MONOCYTES # BLD AUTO: 0.75 K/UL — SIGNIFICANT CHANGE UP (ref 0–0.9)
MONOCYTES NFR BLD AUTO: 10.7 % — SIGNIFICANT CHANGE UP (ref 2–14)
NEUTROPHILS # BLD AUTO: 4.84 K/UL — SIGNIFICANT CHANGE UP (ref 1.8–7.4)
NEUTROPHILS NFR BLD AUTO: 69.1 % — SIGNIFICANT CHANGE UP (ref 43–77)
NRBC # BLD: 0 /100 WBCS — SIGNIFICANT CHANGE UP (ref 0–0)
PLATELET # BLD AUTO: 302 K/UL — SIGNIFICANT CHANGE UP (ref 150–400)
POTASSIUM SERPL-MCNC: 3.7 MMOL/L — SIGNIFICANT CHANGE UP (ref 3.5–5.3)
POTASSIUM SERPL-SCNC: 3.7 MMOL/L — SIGNIFICANT CHANGE UP (ref 3.5–5.3)
PROT SERPL-MCNC: 6.1 G/DL — SIGNIFICANT CHANGE UP (ref 6–8.3)
RBC # BLD: 3.59 M/UL — LOW (ref 3.8–5.2)
RBC # FLD: 17.9 % — HIGH (ref 10.3–14.5)
SODIUM SERPL-SCNC: 134 MMOL/L — LOW (ref 135–145)
WBC # BLD: 7 K/UL — SIGNIFICANT CHANGE UP (ref 3.8–10.5)
WBC # FLD AUTO: 7 K/UL — SIGNIFICANT CHANGE UP (ref 3.8–10.5)

## 2023-05-06 PROCEDURE — 99285 EMERGENCY DEPT VISIT HI MDM: CPT | Mod: GC

## 2023-05-06 PROCEDURE — 71260 CT THORAX DX C+: CPT | Mod: 26,MB

## 2023-05-06 PROCEDURE — 99223 1ST HOSP IP/OBS HIGH 75: CPT

## 2023-05-06 PROCEDURE — 74177 CT ABD & PELVIS W/CONTRAST: CPT | Mod: 26,MB

## 2023-05-06 RX ORDER — ENOXAPARIN SODIUM 100 MG/ML
40 INJECTION SUBCUTANEOUS EVERY 24 HOURS
Refills: 0 | Status: DISCONTINUED | OUTPATIENT
Start: 2023-05-06 | End: 2023-05-07

## 2023-05-06 RX ORDER — LOSARTAN POTASSIUM 100 MG/1
50 TABLET, FILM COATED ORAL EVERY 24 HOURS
Refills: 0 | Status: DISCONTINUED | OUTPATIENT
Start: 2023-05-06 | End: 2023-05-09

## 2023-05-06 NOTE — ED PROVIDER NOTE - PHYSICAL EXAMINATION
Gen: WDWN, NAD, comfortable appearing, jaundiced with scleral icterus   HEENT: Scleral icterus, no nasal discharge, mucous membranes moist  CV: RRR, +S1/S2, no M/R/G, equal b/l radial pulses 2+  Resp: CTAB, no W/R/R, SPO2 >95% on RA, no increased WOB   GI: Abdomen soft non-distended, NTTP, no masses/organomegaly   MSK/Skin: No CVA tenderness, no open wounds, no bruising  Neuro: A&Ox4, moving all 4 extremities spontaneously, gross sensation intact in UE and LE BL  Psych: appropriate mood

## 2023-05-06 NOTE — H&P ADULT - NSHPPHYSICALEXAM_GEN_ALL_CORE
Gen: NAD, jaundiced  HEENT: Scleral icterus present  CV: Regular rate  Resp: Nonlabored breathing on room air  Abd: Soft, nondistended, nontender  Ext: Warm

## 2023-05-06 NOTE — ED ADULT NURSE NOTE - NSHOSCREENINGQ1_ED_ALL_ED
Pt had CPE 10/2022 and IUD removed.  She has OB1 scheduled for ,  GA:6w4d.  Pt calls requesting earlier appt as she feels depressed.  Pt has no motivation to do anything, wants to stay in bed all day.  She has no prior hx of depression or post partum depression.  PHQ9 done over the phone and reviewed with provider.  VO for celexa 20 mg daily, rx pending pharmacy.    Recent PHQ 2/9 Score    PHQ 2:  Date Adult PHQ 2 Score Adult PHQ 2 Interpretation   2023 2 No further screening needed       PHQ 9:  Date Adult PHQ 9 Score Adult PHQ 9 Interpretation   2023 8 Mild Depression      No

## 2023-05-06 NOTE — ED PROVIDER NOTE - CLINICAL SUMMARY MEDICAL DECISION MAKING FREE TEXT BOX
68-year-old female with no past medical history presenting with painless jaundice, pancreatic head lesion on OSH imaging, no B symptoms at this time, mild weight loss, afebrile, abdomen soft/NTTP. Will repeat CTAP with CT chest due to suspicion fo malignancy for staging, basic labs, bili, and reassess

## 2023-05-06 NOTE — ED ADULT TRIAGE NOTE - STATUS:
[facility] [place] [facility]    [address] [place] [address]    Phone:  [phone]    Fax:  [phone]       Thank You for choosing us for your health care visit. We are glad to serve you and happy to provide you with this summary of your visit. Please help us to ensure we have accurate records. If you find anything that needs to be changed, please let our staff know as soon as possible.          Your Demographic Information     Patient Name Sex Trev Lee Male 1972       Ethnic Group Patient Race    Not of  or  Origin White      Your Visit Details     Date & Time Provider Department    2017 9:00 AM Lachelle Frias MD [facility]      Your Upcoming Appointment*(Max 10)     2017  1:30 PM CDT   Follow-up Visit with Rayray Navarro MD   [facility] ([facility])    [address] [address]   [phone]            2017  9:00 AM CDT   Office Visit with Lachelle Frias MD   [facility] [place] [facility] ([facility] [place])    [address] [place] [address]   [phone]              Your To Do List     Future Orders Please Complete On or Around Expires    COMPREHENSIVE METABOLIC PANEL  May 01, 2017 May 31, 2017    GLYCOHEMOGLOBIN  May 01, 2017 May 31, 2017    LIPID PANEL WITH REFLEX  May 01, 2017 May 31, 2017    MICROALBUMIN URINE RANDOM  May 01, 2017 May 31, 2017    Follow-Up    Return in about 6 months (around 2017) for labs next, lab 2-3 days prior to next apt, diabetes.      Conditions Discussed Today or Order-Related Diagnoses        Comments    Controlled type 2 diabetes mellitus with other circulatory complication, without long-term current use of insulin (CMS/[facility])    -  Primary       Your Vitals Were     BP Pulse Resp Height Weight BMI    128/90 88 16 5' 6\" (1.676 m) 217 lb (98.4 kg) 35.02 kg/m2    Smoking Status                   Never Smoker           Medications Prescribed or Re-Ordered Today     losartan (COZAAR) 25 MG tablet    Sig - Route: Take 1 tablet by mouth daily. - Oral    Class: Eprescribe    Pharmacy: [facility] [address] [place] [address] Ph #: [phone]    atorvastatin (LIPITOR) 40 MG tablet    Sig - Route: Take 1 tablet by mouth daily. - Oral    Class: Eprescribe    Pharmacy: [facility] [address] [place] [address] Ph #: [phone]      Your Current Medications Are        Disp Refills Start End    losartan (COZAAR) 25 MG tablet 90 tablet 0 5/1/2017     Sig - Route: Take 1 tablet by mouth daily. - Oral    Class: Eprescribe    atorvastatin (LIPITOR) 40 MG tablet 90 tablet 0 5/1/2017     Sig - Route: Take 1 tablet by mouth daily. - Oral    Class: Eprescribe    aspirin 81 MG tablet        Sig - Route: Take 162 mg by mouth daily. - Oral    Class: Historical Med      Allergies     Lisinopril Cough      Immunizations History as of 5/1/2017     Name Date    INFLUENZA QUADRIVALENT 12/21/2016  3:44 PM, 9/14/2015, 10/6/2014    Influenza 11/15/2013 12:50 PM    MMR 4/11/1991    Tdap 8/6/2012      Problem List as of 5/1/2017     Diabetes type 2, controlled    Hyperlipidemia    Essential hypertension, benign    Obesity (BMI 35.0-39.9 without comorbidity)    Family history of premature CAD    Hypertension    Hypercholesterolemia            Patient Instructions     None       Applied

## 2023-05-06 NOTE — H&P ADULT - HISTORY OF PRESENT ILLNESS
Patient is a 68 year old female with PMHx of HTN presenting with concerns for painless jaundice. Was in [place] this past week when her daughter noticed that she was becoming yellow and having scleral icterus. She saw a general practitioner in [place] who did lab work and imaging demonstrating elevated T Bili and a pancreatic lesion. Patient presents to ED for further work up. Denies nausea, vomiting, changes in bowel movements, abdominal  pain, fevers, chills, and night sweats.    Surgical history of open appendectomy.  Family history significant for breast cancer in sister and mother, as well as another sister with cervical cancer.  Home meds only include losartan 50mg qD.

## 2023-05-06 NOTE — ED ADULT NURSE NOTE - OBJECTIVE STATEMENT
68F aaox4 ambulatory with h/o HTN on Losartan and compliant with meds, recently traveled to [place] and was hospitalized in [facility] and was told to have a Pancreatic lesion that causing her jaundiced. Patient denies any chills or fever. No 68F aaox4 ambulatory with h/o HTN on Losartan and compliant with meds, recently traveled to [place] and was hospitalized in [facility] and was told to have a Pancreatic lesion that causing her jaundiced. Patient denies any chills or fever. No abd pain but reports nausea.

## 2023-05-06 NOTE — H&P ADULT - ASSESSMENT
68 year old female with new finding of multifocal pancreatic tumor. Obstructing tumor seen with elevated T Bili, transaminitis, and ductal dilation on imaging.    Plan:  - Admit to Surgical Oncology/[facility], Dr. Hoskins  - GI consult for ERCP/EUS  - Tumor markers to be sent with AM labs (CEA, )  - Will need tissue diagnosis  - Regular diet for now    Discussed with surgical oncology attending on call, Dr. Hoskins.      [facility]  [id number]

## 2023-05-06 NOTE — ED PROVIDER NOTE - NS ED ATTENDING STATEMENT MOD
Subjective:       Patient ID: Edel Edwards III is a 66 y.o. male.    Chief Complaint: Diabetes; Hypertension; and Hyperlipidemia      HPI  66-year-old male presents for hypertension followup.  Patient is here to establish care as well.  Patient states he is doing well overall.  Would like refills for his knee pain.  States he has refills on all of his other medications.  Follows with Endo for his diabetes.  Last A1c is 6.1.  Denies any other issues at this time.      Review of Systems   Constitutional: Negative for activity change and unexpected weight change.   HENT: Negative for hearing loss, rhinorrhea and trouble swallowing.    Eyes: Negative for discharge and visual disturbance.   Respiratory: Negative for chest tightness and wheezing.    Cardiovascular: Negative for chest pain and palpitations.   Gastrointestinal: Negative for blood in stool, constipation, diarrhea and vomiting.   Endocrine: Negative for polydipsia and polyuria.   Genitourinary: Negative for difficulty urinating, hematuria and urgency.   Musculoskeletal: Positive for neck pain. Negative for arthralgias and joint swelling.   Neurological: Negative for weakness and headaches.   Psychiatric/Behavioral: Negative for confusion and dysphoric mood.          Past Medical History:   Diagnosis Date    Anticoagulant long-term use     Plavix and Aspirin    BMI 33.0-33.9,adult     Cataract     Coronary artery disease     Diabetes mellitus type 2 in obese     HTN (hypertension)     Hyperlipidemia     Sciatica      Past Surgical History:   Procedure Laterality Date    coronary stents      JOINT REPLACEMENT Left 11/19/2015    LEFT HEART CATHETERIZATION Left 7/6/2018    Procedure: Left heart cath;  Surgeon: Teja Eaton MD;  Location: [facility] CATH LAB;  Service: Cardiology;  Laterality: Left;  RN PREOP 7/5/2018    NONE       Family History   Problem Relation Age of Onset    Hypertension Mother     Stroke Father     No Known Problems  Sister     No Known Problems Sister     Diabetes Maternal Aunt     Diabetes Paternal Grandmother     No Known Problems Brother     No Known Problems Maternal Uncle     No Known Problems Paternal Aunt     No Known Problems Paternal Uncle     No Known Problems Maternal Grandmother     No Known Problems Maternal Grandfather     No Known Problems Paternal Grandfather     Amblyopia Neg Hx     Blindness Neg Hx     Cancer Neg Hx     Cataracts Neg Hx     Glaucoma Neg Hx     Macular degeneration Neg Hx     Retinal detachment Neg Hx     Strabismus Neg Hx     Thyroid disease Neg Hx      Social History     Socioeconomic History    Marital status:      Spouse name: Not on file    Number of children: Not on file    Years of education: Not on file    Highest education level: Not on file   Occupational History    Not on file   Social Needs    Financial resource strain: Not very hard    Food insecurity:     Worry: Never true     Inability: Never true    Transportation needs:     Medical: No     Non-medical: No   Tobacco Use    Smoking status: Former Smoker     Last attempt to quit: 6/5/2009     Years since quitting: 10.7    Smokeless tobacco: Never Used    Tobacco comment: quit 2010   Substance and Sexual Activity    Alcohol use: No     Frequency: Never     Drinks per session: Patient refused     Binge frequency: Never    Drug use: No    Sexual activity: Yes     Partners: Female   Lifestyle    Physical activity:     Days per week: 0 days     Minutes per session: 0 min    Stress: Only a little   Relationships    Social connections:     Talks on phone: More than three times a week     Gets together: Once a week     Attends [employer] service: Not on file     Active member of club or organization: No     Attends meetings of clubs or organizations: Never     Relationship status:    Other Topics Concern    Not on file   Social History Narrative    Not on file       Current Outpatient  Medications:     ACCU-CHEK SMARTVIEW TEST STRIP Strp, as needed. , Disp: , Rfl: 1    aspirin (ECOTRIN) 81 MG EC tablet, aspirin, Disp: , Rfl:     atorvastatin (LIPITOR) 40 MG tablet, TAKE ONE-HALF TABLET BY MOUTH once DAILY, Disp: 45 tablet, Rfl: 3    azilsartan medoxomil (EDARBI) 80 mg Tab, Take by mouth once daily. , Disp: , Rfl:     clopidogrel (PLAVIX) 75 mg tablet, TAKE ONE TABLET BY MOUTH once DAILY, Disp: 90 tablet, Rfl: 3    clotrimazole-betamethasone 1-0.05% (LOTRISONE) cream, Apply topically as needed., Disp: , Rfl:     diphth,pertus,acell,,tetanus (BOOSTRIX TDAP) 2.5-8-5 Lf-mcg-Lf/0.5mL Syrg injection, Boostrix Tdap 2.5 Lf unit-8 mcg-5 Lf/0.5 mL intramuscular syringe  inject 0.5 milliliter intramuscularly, Disp: , Rfl:     etodolac (LODINE) 500 MG tablet, Take 1 tablet (500 mg total) by mouth 2 (two) times daily., Disp: 180 tablet, Rfl: 1    flu vac ts 2016-17,4 yr up,/PF (FLU VAC TS 2014-15,36MOS+,,PF,) 45 mcg (15 mcg x 3)/0.5 mL, Flucelvax 0272-9840 (PF) 45 mcg (15 mcg x 3)/0.5 mL IM syringe  inject 0.5 milliliter intramuscularly, Disp: , Rfl:     gabapentin (NEURONTIN) 800 MG tablet, Take 0.5 tablets (400 mg total) by mouth once daily., Disp: 90 tablet, Rfl: 3    influenza (FLULAVAL QUADRIVALENT) 60 mcg (15 mcg x 4)/0.5 mL Syrg vaccine, Fluarix Quad [id number] (PF) 60 mcg (15 mcg x 4)/0.5 mL IM syringe  inject 0.5 milliliter intramuscularly, Disp: , Rfl:     INVOKANA 300 mg Tab tablet, Take 300 mg by mouth once daily., Disp: , Rfl:     isosorbide mononitrate (IMDUR) 30 MG 24 hr tablet, TAKE ONE TABLET BY MOUTH once DAILY, Disp: 90 tablet, Rfl: 3    ketoconazole (NIZORAL) 2 % cream, , Disp: , Rfl:     metformin (GLUCOPHAGE) 1000 MG tablet, Take 1,000 mg by mouth 2 (two) times daily with meals.  , Disp: , Rfl:     metoprolol succinate (TOPROL-XL) 25 MG 24 hr tablet, TAKE ONE TABLET BY MOUTH once DAILY, Disp: 30 tablet, Rfl: 11    OZEMPIC 1 mg/dose (2 mg/1.5 mL) PnIj, INJECT 1 MG WEEKLY,  Disp: , Rfl:     tamsulosin (FLOMAX) 0.4 mg Cap, Take 1 capsule (0.4 mg total) by mouth once daily., Disp: 90 capsule, Rfl: 3    terazosin (HYTRIN) 2 MG capsule, Take 2 mg by mouth every evening. , Disp: , Rfl:     zoster vaccine live, PF, (ZOSTAVAX, PF,) 19,400 unit/0.65 mL injection, Zostavax (PF) 19,400 unit/0.65 mL subcutaneous suspension  inject contents of 1 vial subcutaneously, Disp: , Rfl:     aspirin (ECOTRIN) 81 MG EC tablet, Take 81 mg by mouth once daily., Disp: , Rfl:    Objective:      Vitals:    03/13/20 0818   BP: 102/68   BP Location: Right arm   Patient Position: Sitting   BP Method: Large (Manual)   Pulse: 69   Resp: 20   Temp: 98.4 °F (36.9 °C)   TempSrc: Oral   SpO2: 97%   Weight: 107 kg (235 lb 14.3 oz)   Height: 6' (1.829 m)       Physical Exam   Constitutional: He is oriented to person, place, and time. No distress.   HENT:   Head: Normocephalic and atraumatic.   Eyes: Conjunctivae are normal.   Neck: Neck supple.   Cardiovascular: Normal rate, regular rhythm and normal heart sounds. Exam reveals no gallop and no friction rub.   No murmur heard.  Pulmonary/Chest: Effort normal and breath sounds normal. He has no wheezes. He has no rales.   Neurological: He is alert and oriented to person, place, and time.   Skin: Skin is warm and dry.   Psychiatric: He has a normal mood and affect. His behavior is normal. Judgment and thought content normal.        Assessment:       1. Chronic pain of both knees    2. Need for vaccination with 13-polyvalent pneumococcal conjugate vaccine    3. Encounter for abdominal aortic aneurysm (AAA) screening    4. Influenza vaccine needed    5. Diabetes mellitus type 2 in obese    6. Essential hypertension    7. Coronary artery disease involving native coronary artery of native heart without angina pectoris    8. Hyperlipidemia, unspecified hyperlipidemia type        Plan:       Chronic pain of both knees  -     etodolac (LODINE) 500 MG tablet; Take 1 tablet (500  mg total) by mouth 2 (two) times daily.  Dispense: 180 tablet; Refill: 1    Need for vaccination with 13-polyvalent pneumococcal conjugate vaccine  -     (In Office Administered) Pneumococcal Conjugate Vaccine (13 Valent) (IM)    Encounter for abdominal aortic aneurysm (AAA) screening  -     US Abdominal Aorta; Future; Expected date: 03/13/2020    Influenza vaccine needed  -     Influenza - High Dose (65+) (PF) (IM)    Diabetes mellitus type 2 in obese  Stable. Continue regimen. On 150mg of invokana    Essential hypertension  Stable. Continue regimen    Coronary artery disease involving native coronary artery of native heart without angina pectoris  Stable. Continue regimen    Hyperlipidemia, unspecified hyperlipidemia type  Stable. Continue regimen      Follow up in about 6 months (around 9/13/2020).            Osito Thacker MD      Patient note was created using [employer].  Any errors in syntax or even information may not have been identified and edited on initial review prior to signing this note.   Attending with

## 2023-05-06 NOTE — ED PROVIDER NOTE - ATTENDING CONTRIBUTION TO CARE
Attending (Rigoberto Strauss M.D.):  I have personally seen and examined this patient. I have performed a substantive portion of the visit including all aspects of the medical decision making. Resident and/or ACP note reviewed. I agree on the plan of care except where noted.

## 2023-05-06 NOTE — ED PROVIDER NOTE - OBJECTIVE STATEMENT
68-year-old female with no past medical history presenting with painless jaundice.  Patient recently returned from [place] but daughter noticed upon arrival tiring movement skin started become yellow.  Obtain CT abdomen pelvis and ultrasound at this hospital in [place] that showed large pancreatic head lesion with double duct sign.  Patient reports mild 5 pound weight loss with no associated night sweats, fever/chills, nausea/vomiting/diarrhea, cough, rashes, changes in urination, abdominal or back pain.

## 2023-05-06 NOTE — PATIENT PROFILE ADULT - FALL HARM RISK - UNIVERSAL INTERVENTIONS
Bed in lowest position, wheels locked, appropriate side rails in place/Call bell, personal items and telephone in reach/Instruct patient to call for assistance before getting out of bed or chair/Non-slip footwear when patient is out of bed/[place] to call system/Physically safe environment - no spills, clutter or unnecessary equipment/Purposeful Proactive Rounding/Room/bathroom lighting operational, light cord in reach

## 2023-05-06 NOTE — H&P ADULT - NSHPLABSRESULTS_GEN_ALL_CORE
11.9   7.00  )-----------( 302      ( 06 May 2023 12:25 )             33.9     05-06    134<L>  |  98  |  10  ----------------------------<  115<H>  3.7   |  21<L>  |  0.48<L>    Ca    9.5      06 May 2023 12:25    TPro  6.1  /  Alb  3.6  /  TBili  20.5<H>  /  DBili  >10.0<H>  /  AST  345<H>  /  ALT  567<H>  /  AlkPhos  953<H>  05-06        IMAGING:  < from: CT Abdomen and Pelvis w/ IV Cont (05.06.23 @ 12:53) >      ACC: [id number] EXAM:  CT ABDOMEN AND PELVIS IC   ORDERED BY: ETHAN PIERCE     ACC: [id number] EXAM:  CT CHEST IC   ORDERED BY: ETHAN PIERCE     PROCEDURE DATE:  05/06/2023          INTERPRETATION:  CLINICAL INFORMATION: Painless jaundice. Pancreatic head   mass on outside imaging.    COMPARISON: None.    CONTRAST/COMPLICATIONS:  IV Contrast: IV contrast documented in unlinked concurrent exam   (accession [id number]), Omnipaque 350 (accession [id number])  90 cc   administered   10 cc discarded  Oral Contrast: NONE  Complications: None reported at time of study completion    PROCEDURE:  CT of the Chest, Abdomen and Pelvis was performed.  Sagittal and coronal reformats were performed.    FINDINGS:  CHEST:  LUNGS AND LARGE AIRWAYS: Patent central airways. No pulmonary nodules.  PLEURA: No pleural effusion.  VESSELS: Within normal limits.  HEART: Heart size is normal. No pericardial effusion.  MEDIASTINUM AND DAMION: No lymphadenopathy.  CHEST WALL AND LOWER NECK: Within normal limits.    ABDOMEN AND PELVIS:  LIVER: Within normal limits.  BILE DUCTS: Intra and extrahepatic biliary ductal dilatation. The CBD   measures up to 18 mm with abrupt cut off at the level of the pancreatic   head.  GALLBLADDER: Contracted  SPLEEN: Within normal limits.  PANCREAS: Main pancreatic ductal dilatation measuring up to 7 mm.   Multiple soft tissue masses in the pancreas, measuring 2.0 cm in the tail   (2; 84), 1.9 cm in the body (2; 89) and 2.3 cm in the neck (2;   93).Evaluation for vascular involvement is limited by lack of arterial   phase imaging, however less than 180 degree contact with the SMV is   noted. There is no evidence of celiac or SMA involvement.  ADRENALS: Within normal limits.  KIDNEYS/URETERS: Within normal limits.    BLADDER: Within normal limits.  REPRODUCTIVE ORGANS: Uterus and adnexa within normal limits.    BOWEL: Colonic diverticulosis. No bowel wall thickening or inflammation.   Appendix not visualized.  PERITONEUM: No ascites or peritoneal implant.  VESSELS: There is no neoplastic involvement of the celiac axis or SMA.   Atherosclerotic changes notable.  RETROPERITONEUM/LYMPH NODES: No significant lymphadenopathy.  ABDOMINAL WALL: Within normal limits.  BONES: Degenerative changes. No aggressive osseous lesions.    IMPRESSION:  Intra-and extrahepatic biliary and pancreatic ductal dilatation secondary   to a mass in the pancreatic neck. Additional soft tissue pancreatic   masses noted in the body and tail suspicious for multifocal pancreatic   malignancy. Recommend MRI abdomen with/without IV contrast for further   characterization.    --- End of Report ---           TRAVIS DESIR MD; Resident Radiologist  This document has been electronically signed.  TRENTON VARNER MD; Attending Radiologist  This documenthas been electronically signed. May  6 2023  1:43PM 11.9   7.00  )-----------( 302      ( 06 May 2023 12:25 )             33.9     05-06    134<L>  |  98  |  10  ----------------------------<  115<H>  3.7   |  21<L>  |  0.48<L>    Ca    9.5      06 May 2023 12:25    TPro  6.1  /  Alb  3.6  /  TBili  20.5<H>  /  DBili  >10.0<H>  /  AST  345<H>  /  ALT  567<H>  /  AlkPhos  953<H>  05-06        IMAGING:  < from: CT Abdomen and Pelvis w/ IV Cont (05.06.23 @ 12:53) >      ACC: [id number] EXAM:  CT ABDOMEN AND PELVIS IC   ORDERED BY: ETHAN PIERCE     ACC: [id number] EXAM:  CT CHEST IC   ORDERED BY: ETHAN PIERCE     PROCEDURE DATE:  05/06/2023          INTERPRETATION:  CLINICAL INFORMATION: Painless jaundice. Pancreatic head   mass on outside imaging.    COMPARISON: None.    CONTRAST/COMPLICATIONS:  IV Contrast: IV contrast documented in unlinked concurrent exam   (accession [id number]), Omnipaque 350 (accession [id number])  90 cc   administered   10 cc discarded  Oral Contrast: NONE  Complications: None reported at time of study completion    PROCEDURE:  CT of the Chest, Abdomen and Pelvis was performed.  Sagittal and coronal reformats were performed.    FINDINGS:  CHEST:  LUNGS AND LARGE AIRWAYS: Patent central airways. No pulmonary nodules.  PLEURA: No pleural effusion.  VESSELS: Within normal limits.  HEART: Heart size is normal. No pericardial effusion.  MEDIASTINUM AND DAMION: No lymphadenopathy.  CHEST WALL AND LOWER NECK: Within normal limits.    ABDOMEN AND PELVIS:  LIVER: Within normal limits.  BILE DUCTS: Intra and extrahepatic biliary ductal dilatation. The CBD   measures up to 18 mm with abrupt cut off at the level of the pancreatic   head.  GALLBLADDER: Contracted  SPLEEN: Within normal limits.  PANCREAS: Main pancreatic ductal dilatation measuring up to 7 mm.   Multiple soft tissue masses in the pancreas, measuring 2.0 cm in the tail   (2; 84), 1.9 cm in the body (2; 89) and 2.3 cm in the neck (2;   93).Evaluation for vascular involvement is limited by lack of arterial   phase imaging, however less than 180 degree contact with the SMV is   noted. There is no evidence of celiac or SMA involvement.  ADRENALS: Within normal limits.  KIDNEYS/URETERS: Within normal limits.    BLADDER: Within normal limits.  REPRODUCTIVE ORGANS: Uterus and adnexa within normal limits.    BOWEL: Colonic diverticulosis. No bowel wall thickening or inflammation.   Appendix not visualized.  PERITONEUM: No ascites or peritoneal implant.  VESSELS: There is no neoplastic involvement of the celiac axis or SMA.   Atherosclerotic changes notable.  RETROPERITONEUM/LYMPH NODES: No significant lymphadenopathy.  ABDOMINAL WALL: Within normal limits.  BONES: Degenerative changes. No aggressive osseous lesions.    IMPRESSION:  Intra-and extrahepatic biliary and pancreatic ductal dilatation secondary   to a mass in the pancreatic neck. Additional soft tissue pancreatic   masses noted in the body and tail suspicious for multifocal pancreatic   malignancy. Recommend MRI abdomen with/without IV contrast for further   characterization.    --- End of Report ---           TRAVIS DESIR MD; Resident Radiologist  This document has been electronically signed.  TRENTON VARNER MD; Attending Radiologist  This documenthas been electronically signed. May  6 2023  1:43PM 11.9   7.00  )-----------( 302      ( 06 May 2023 12:25 )             33.9     05-06    134<L>  |  98  |  10  ----------------------------<  115<H>  3.7   |  21<L>  |  0.48<L>    Ca    9.5      06 May 2023 12:25    TPro  6.1  /  Alb  3.6  /  TBili  20.5<H>  /  DBili  >10.0<H>  /  AST  345<H>  /  ALT  567<H>  /  AlkPhos  953<H>  05-06        IMAGING:  < from: CT Abdomen and Pelvis w/ IV Cont (05.06.23 @ 12:53) >      ACC: [id number] EXAM:  CT ABDOMEN AND PELVIS IC   ORDERED BY: ETHAN PIERCE     ACC: [id number] EXAM:  CT CHEST IC   ORDERED BY: ETHAN PIERCE     PROCEDURE DATE:  05/06/2023          INTERPRETATION:  CLINICAL INFORMATION: Painless jaundice. Pancreatic head   mass on outside imaging.    COMPARISON: None.    CONTRAST/COMPLICATIONS:  IV Contrast: IV contrast documented in unlinked concurrent exam   (accession [id number]), Omnipaque 350 (accession [id number])  90 cc   administered   10 cc discarded  Oral Contrast: NONE  Complications: None reported at time of study completion    PROCEDURE:  CT of the Chest, Abdomen and Pelvis was performed.  Sagittal and coronal reformats were performed.    FINDINGS:  CHEST:  LUNGS AND LARGE AIRWAYS: Patent central airways. No pulmonary nodules.  PLEURA: No pleural effusion.  VESSELS: Within normal limits.  HEART: Heart size is normal. No pericardial effusion.  MEDIASTINUM AND DAMION: No lymphadenopathy.  CHEST WALL AND LOWER NECK: Within normal limits.    ABDOMEN AND PELVIS:  LIVER: Within normal limits.  BILE DUCTS: Intra and extrahepatic biliary ductal dilatation. The CBD   measures up to 18 mm with abrupt cut off at the level of the pancreatic   head.  GALLBLADDER: Contracted  SPLEEN: Within normal limits.  PANCREAS: Main pancreatic ductal dilatation measuring up to 7 mm.   Multiple soft tissue masses in the pancreas, measuring 2.0 cm in the tail   (2; 84), 1.9 cm in the body (2; 89) and 2.3 cm in the neck (2;   93).Evaluation for vascular involvement is limited by lack of arterial   phase imaging, however less than 180 degree contact with the SMV is   noted. There is no evidence of celiac or SMA involvement.  ADRENALS: Within normal limits.  KIDNEYS/URETERS: Within normal limits.    BLADDER: Within normal limits.  REPRODUCTIVE ORGANS: Uterus and adnexa within normal limits.    BOWEL: Colonic diverticulosis. No bowel wall thickening or inflammation.   Appendix not visualized.  PERITONEUM: No ascites or peritoneal implant.  VESSELS: There is no neoplastic involvement of the celiac axis or SMA.   Atherosclerotic changes notable.  RETROPERITONEUM/LYMPH NODES: No significant lymphadenopathy.  ABDOMINAL WALL: Within normal limits.  BONES: Degenerative changes. No aggressive osseous lesions.    IMPRESSION:  Intra-and extrahepatic biliary and pancreatic ductal dilatation secondary   to a mass in the pancreatic neck. Additional soft tissue pancreatic   masses noted in the body and tail suspicious for multifocal pancreatic   malignancy. Recommend MRI abdomen with/without IV contrast for further   characterization.    --- End of Report ---           TRAVIS DESIR MD; Resident Radiologist  This document has been electronically signed.  TRENTON VARNER MD; Attending Radiologist  This documenthas been electronically signed. May  6 2023  1:43PM

## 2023-05-07 LAB
ALBUMIN SERPL ELPH-MCNC: 3.2 G/DL — LOW (ref 3.3–5)
ALP SERPL-CCNC: 911 U/L — HIGH (ref 40–120)
ALT FLD-CCNC: 481 U/L — HIGH (ref 10–45)
ANION GAP SERPL CALC-SCNC: 15 MMOL/L — SIGNIFICANT CHANGE UP (ref 5–17)
AST SERPL-CCNC: 289 U/L — HIGH (ref 10–40)
BILIRUB SERPL-MCNC: 20.7 MG/DL — HIGH (ref 0.2–1.2)
BUN SERPL-MCNC: 13 MG/DL — SIGNIFICANT CHANGE UP (ref 7–23)
CALCIUM SERPL-MCNC: 9.3 MG/DL — SIGNIFICANT CHANGE UP (ref 8.4–10.5)
CANCER AG19-9 SERPL-ACNC: 2780 U/ML — HIGH
CEA SERPL-MCNC: 2.8 NG/ML — SIGNIFICANT CHANGE UP (ref 0–3.8)
CHLORIDE SERPL-SCNC: 100 MMOL/L — SIGNIFICANT CHANGE UP (ref 96–108)
CO2 SERPL-SCNC: 21 MMOL/L — LOW (ref 22–31)
CREAT SERPL-MCNC: 0.56 MG/DL — SIGNIFICANT CHANGE UP (ref 0.5–1.3)
EGFR: 100 ML/MIN/1.73M2 — SIGNIFICANT CHANGE UP
GLUCOSE SERPL-MCNC: 106 MG/DL — HIGH (ref 70–99)
HCT VFR BLD CALC: 33 % — LOW (ref 34.5–45)
HCV AB S/CO SERPL IA: 0.1 S/CO — SIGNIFICANT CHANGE UP (ref 0–0.99)
HCV AB SERPL-IMP: SIGNIFICANT CHANGE UP
HGB BLD-MCNC: 11.8 G/DL — SIGNIFICANT CHANGE UP (ref 11.5–15.5)
MAGNESIUM SERPL-MCNC: 1.7 MG/DL — SIGNIFICANT CHANGE UP (ref 1.6–2.6)
MCHC RBC-ENTMCNC: 33.5 PG — SIGNIFICANT CHANGE UP (ref 27–34)
MCHC RBC-ENTMCNC: 35.8 GM/DL — SIGNIFICANT CHANGE UP (ref 32–36)
MCV RBC AUTO: 93.8 FL — SIGNIFICANT CHANGE UP (ref 80–100)
NRBC # BLD: 0 /100 WBCS — SIGNIFICANT CHANGE UP (ref 0–0)
PHOSPHATE SERPL-MCNC: 3.1 MG/DL — SIGNIFICANT CHANGE UP (ref 2.5–4.5)
PLATELET # BLD AUTO: 305 K/UL — SIGNIFICANT CHANGE UP (ref 150–400)
POTASSIUM SERPL-MCNC: 3.6 MMOL/L — SIGNIFICANT CHANGE UP (ref 3.5–5.3)
POTASSIUM SERPL-SCNC: 3.6 MMOL/L — SIGNIFICANT CHANGE UP (ref 3.5–5.3)
PROT SERPL-MCNC: 5.6 G/DL — LOW (ref 6–8.3)
RBC # BLD: 3.52 M/UL — LOW (ref 3.8–5.2)
RBC # FLD: 18.1 % — HIGH (ref 10.3–14.5)
SODIUM SERPL-SCNC: 136 MMOL/L — SIGNIFICANT CHANGE UP (ref 135–145)
WBC # BLD: 5.26 K/UL — SIGNIFICANT CHANGE UP (ref 3.8–10.5)
WBC # FLD AUTO: 5.26 K/UL — SIGNIFICANT CHANGE UP (ref 3.8–10.5)

## 2023-05-07 PROCEDURE — 99232 SBSQ HOSP IP/OBS MODERATE 35: CPT

## 2023-05-07 RX ORDER — DIPHENHYDRAMINE HCL 50 MG
25 CAPSULE ORAL ONCE
Refills: 0 | Status: COMPLETED | OUTPATIENT
Start: 2023-05-07 | End: 2023-05-07

## 2023-05-07 RX ORDER — SODIUM CHLORIDE 9 MG/ML
1000 INJECTION, SOLUTION INTRAVENOUS
Refills: 0 | Status: DISCONTINUED | OUTPATIENT
Start: 2023-05-07 | End: 2023-05-09

## 2023-05-07 RX ORDER — DIPHENHYDRAMINE HCL 50 MG
25 CAPSULE ORAL EVERY 4 HOURS
Refills: 0 | Status: DISCONTINUED | OUTPATIENT
Start: 2023-05-07 | End: 2023-05-07

## 2023-05-07 RX ORDER — POTASSIUM CHLORIDE 20 MEQ
10 PACKET (EA) ORAL
Refills: 0 | Status: COMPLETED | OUTPATIENT
Start: 2023-05-07 | End: 2023-05-07

## 2023-05-07 RX ORDER — MAGNESIUM SULFATE 500 MG/ML
2 VIAL (ML) INJECTION ONCE
Refills: 0 | Status: COMPLETED | OUTPATIENT
Start: 2023-05-07 | End: 2023-05-07

## 2023-05-07 RX ADMIN — Medication 100 MILLIEQUIVALENT(S): at 12:30

## 2023-05-07 RX ADMIN — Medication 25 GRAM(S): at 11:20

## 2023-05-07 RX ADMIN — SODIUM CHLORIDE 75 MILLILITER(S): 9 INJECTION, SOLUTION INTRAVENOUS at 02:30

## 2023-05-07 RX ADMIN — ENOXAPARIN SODIUM 40 MILLIGRAM(S): 100 INJECTION SUBCUTANEOUS at 06:24

## 2023-05-07 RX ADMIN — Medication 100 MILLIEQUIVALENT(S): at 17:30

## 2023-05-07 RX ADMIN — Medication 25 MILLIGRAM(S): at 13:21

## 2023-05-07 RX ADMIN — LOSARTAN POTASSIUM 50 MILLIGRAM(S): 100 TABLET, FILM COATED ORAL at 06:24

## 2023-05-07 RX ADMIN — Medication 100 MILLIEQUIVALENT(S): at 11:21

## 2023-05-07 NOTE — CONSULT NOTE ADULT - SUBJECTIVE AND OBJECTIVE BOX
Chief Complaint:  Patient is a 68y old  Female who presents with a chief complaint of New finding of pancreatic tumor (07 May 2023 11:06)      HPI: 68 year old female with PMHx of HTN presenting with concerns for painless jaundice. Was in [place] this past week when her daughter noticed that she was becoming jaundiced and having scleral icterus. Pt saw a general practitioner in [place] who did lab work and imaging demonstrating elevated T Bili and a pancreatic lesion. Patient presented to ED for further work up. Labs here notable for Ca 19-9 2780, CEA 2.8, Tbili 20.7, , , . CT A/P showing intra-and extrahepatic biliary and pancreatic ductal dilatation secondary to a mass in the pancreatic neck; additional soft tissue pancreatic masses noted in the body and tail suspicious for multifocal pancreatic malignancy. Denies nausea, vomiting, changes in bowel movements, abdominal  pain, fevers, chills, and night sweats.    Otherwise, patient denies fevers, chills, weight loss, dysphagia, odynophagia, early satiety, poor oral intake, abdominal pain, nausea, vomiting, diarrhea, melena, hematemesis, hematochezia, change in stool caliber, or family history of GI-related cancers.    Allergies:  No Known Allergies      Home Medications:    Hospital Medications:  diphenhydrAMINE 25 milliGRAM(s) Oral once  enoxaparin Injectable 40 milliGRAM(s) SubCutaneous every 24 hours  lactated ringers. 1000 milliLiter(s) IV Continuous <Continuous>  losartan 50 milliGRAM(s) Oral every 24 hours  potassium chloride  10 mEq/100 mL IVPB 10 milliEquivalent(s) IV Intermittent every 1 hour      PMHX/PSHX:  Hypertension    No significant past surgical history    History of appendectomy        Family history:   breast cancer in sister and mother, as well as another sister with cervical cancer.    Social History:   ETOH: denies  Tobacco: denies  Illicit drug use: denies    ROS: 14 point ROS negative unless otherwise stated in HPI      Vital Signs:  Vital Signs Last 24 Hrs  T(C): 36.8 (07 May 2023 11:20), Max: 37.6 (07 May 2023 00:52)  T(F): 98.3 (07 May 2023 11:20), Max: 99.7 (07 May 2023 00:52)  HR: 81 (07 May 2023 11:20) (74 - 82)  BP: 147/67 (07 May 2023 11:20) (121/63 - 158/78)  BP(mean): --  RR: 18 (07 May 2023 11:20) (16 - 18)  SpO2: 98% (07 May 2023 11:20) (96% - 99%)    Parameters below as of 07 May 2023 11:20  Patient On (Oxygen Delivery Method): room air      Daily     Daily     PHYSICAL EXAM:     GENERAL:  Appears stated age, well-groomed, +chronically ill appearing; no distress  HEENT:  NC/AT,  +scleral icterus  CHEST:  Full & symmetric excursion, no increased effort, breath sounds clear  HEART:  Regular rhythm, S1, S2, no murmur/rub/S3/S4  ABDOMEN:  Soft, non-tender, non-distended, normoactive bowel sounds,    EXTREMITIES:  no cyanosis,clubbing or edema  SKIN:  +jaundice  NEURO:  Alert, orientedx3      LABS:                        11.8   5.26  )-----------( 305      ( 07 May 2023 07:22 )             33.0     05-07    136  |  100  |  13  ----------------------------<  106<H>  3.6   |  21<L>  |  0.56    Ca    9.3      07 May 2023 07:23  Phos  3.1     05-07  Mg     1.7     05-07    TPro  5.6<L>  /  Alb  3.2<L>  /  TBili  20.7<H>  /  DBili  x   /  AST  289<H>  /  ALT  481<H>  /  AlkPhos  911<H>  05-07    LIVER FUNCTIONS - ( 07 May 2023 07:23 )  Alb: 3.2 g/dL / Pro: 5.6 g/dL / ALK PHOS: 911 U/L / ALT: 481 U/L / AST: 289 U/L / GGT: x                   Imaging:       ACC: [id number] EXAM:  CT ABDOMEN AND PELVIS IC   ORDERED BY: ETHAN PIERCE     ACC: [id number] EXAM:  CT CHEST IC   ORDERED BY: ETHAN PIERCE     PROCEDURE DATE:  05/06/2023          INTERPRETATION:  CLINICAL INFORMATION: Painless jaundice. Pancreatic head   mass on outside imaging.    COMPARISON: None.    CONTRAST/COMPLICATIONS:  IV Contrast: IV contrast documented in unlinked concurrent exam   (accession [id number]), Omnipaque 350 (accession [id number])  90 cc   administered   10 cc discarded  Oral Contrast: NONE  Complications: None reported at time of study completion    PROCEDURE:  CT of the Chest, Abdomen and Pelvis was performed.  Sagittal and coronal reformats were performed.    FINDINGS:  CHEST:  LUNGS AND LARGE AIRWAYS: Patent central airways. No pulmonary nodules.  PLEURA: No pleural effusion.  VESSELS: Within normal limits.  HEART: Heart size is normal. No pericardial effusion.  MEDIASTINUM AND DAMION: No lymphadenopathy.  CHEST WALL AND LOWER NECK: Within normal limits.    ABDOMEN AND PELVIS:  LIVER: Within normal limits.  BILE DUCTS: Intra and extrahepatic biliary ductal dilatation. The CBD   measures up to 18 mm with abrupt cut off at the level of the pancreatic   head.  GALLBLADDER: Contracted  SPLEEN: Within normal limits.  PANCREAS: Main pancreatic ductal dilatation measuring up to 7 mm.   Multiple soft tissue masses in the pancreas, measuring 2.0 cm in the tail   (2; 84), 1.9 cm in the body (2; 89) and 2.3 cm in the neck (2;   93).Evaluation for vascular involvement is limited by lack of arterial   phase imaging, however less than 180 degree contact with the SMV is   noted. There is no evidence of celiac or SMA involvement.  ADRENALS: Within normal limits.  KIDNEYS/URETERS: Within normal limits.    BLADDER: Within normal limits.  REPRODUCTIVE ORGANS: Uterus and adnexa within normal limits.    BOWEL: Colonic diverticulosis. No bowel wall thickening or inflammation.   Appendix not visualized.  PERITONEUM: No ascites or peritoneal implant.  VESSELS: There is no neoplastic involvement of the celiac axis or SMA.   Atherosclerotic changes notable.  RETROPERITONEUM/LYMPH NODES: No significant lymphadenopathy.  ABDOMINAL WALL: Within normal limits.  BONES: Degenerative changes. No aggressive osseous lesions.    IMPRESSION:  Intra-and extrahepatic biliary and pancreatic ductal dilatation secondary   to a mass in the pancreatic neck. Additional soft tissue pancreatic   masses noted in the body and tail suspicious for multifocal pancreatic   malignancy. Recommend MRI abdomen with/without IV contrast for further   characterization.           Chief Complaint:  Patient is a 68y old  Female who presents with a chief complaint of New finding of pancreatic tumor (07 May 2023 11:06)      HPI: 68 year old female with PMHx of HTN presenting with concerns for painless jaundice. Was in [place] this past week when her daughter noticed that she was becoming jaundiced and having scleral icterus. Pt saw a general practitioner in [place] who did lab work and imaging demonstrating elevated T Bili and a pancreatic lesion. Patient left [place] and [place] back [place] the [place] to exped[place] further work up. Pt and daughter also note poor appetite over the last 6-8 weeks. Labs here notable for Ca 19-9 2780, CEA 2.8, Tbili 20.7, , , . CT A/P showing intra-and extrahepatic biliary and pancreatic ductal dilatation secondary to a mass in the pancreatic neck; additional soft tissue pancreatic masses noted in the body and tail suspicious for multifocal pancreatic malignancy. Denies nausea, vomiting, abdominal pain, fevers, chills, and night sweats. GI consulted for endoscopic eval of pancreatic mass.    Allergies:  No Known Allergies      Home Medications:    Hospital Medications:  diphenhydrAMINE 25 milliGRAM(s) Oral once  enoxaparin Injectable 40 milliGRAM(s) SubCutaneous every 24 hours  lactated ringers. 1000 milliLiter(s) IV Continuous <Continuous>  losartan 50 milliGRAM(s) Oral every 24 hours  potassium chloride  10 mEq/100 mL IVPB 10 milliEquivalent(s) IV Intermittent every 1 hour      PMHX/PSHX:  Hypertension    No significant past surgical history    History of appendectomy        Family history:   breast cancer in sister and mother, as well as another sister with cervical cancer.    Social History:   ETOH: denies  Tobacco: denies  Illicit drug use: denies    ROS: 14 point ROS negative unless otherwise stated in HPI      Vital Signs:  Vital Signs Last 24 Hrs  T(C): 36.8 (07 May 2023 11:20), Max: 37.6 (07 May 2023 00:52)  T(F): 98.3 (07 May 2023 11:20), Max: 99.7 (07 May 2023 00:52)  HR: 81 (07 May 2023 11:20) (74 - 82)  BP: 147/67 (07 May 2023 11:20) (121/63 - 158/78)  BP(mean): --  RR: 18 (07 May 2023 11:20) (16 - 18)  SpO2: 98% (07 May 2023 11:20) (96% - 99%)    Parameters below as of 07 May 2023 11:20  Patient On (Oxygen Delivery Method): room air      Daily     Daily     PHYSICAL EXAM:     GENERAL:  Appears stated age, well-groomed, +chronically ill appearing; no distress  HEENT:  NC/AT,  +scleral icterus  CHEST:  Full & symmetric excursion, no increased effort  HEART:  Regular rhythm, S1, S2, no murmur/rub/S3/S4  ABDOMEN:  Soft, non-tender, non-distended  EXTREMITIES:  no cyanosis,clubbing or edema  SKIN:  +jaundice  NEURO:  Alert, orientedx3      LABS:                        11.8   5.26  )-----------( 305      ( 07 May 2023 07:22 )             33.0     05-07    136  |  100  |  13  ----------------------------<  106<H>  3.6   |  21<L>  |  0.56    Ca    9.3      07 May 2023 07:23  Phos  3.1     05-07  Mg     1.7     05-07    TPro  5.6<L>  /  Alb  3.2<L>  /  TBili  20.7<H>  /  DBili  x   /  AST  289<H>  /  ALT  481<H>  /  AlkPhos  911<H>  05-07    LIVER FUNCTIONS - ( 07 May 2023 07:23 )  Alb: 3.2 g/dL / Pro: 5.6 g/dL / ALK PHOS: 911 U/L / ALT: 481 U/L / AST: 289 U/L / GGT: x                   Imaging:       ACC: [id number] EXAM:  CT ABDOMEN AND PELVIS IC   ORDERED BY: ETHAN PIERCE     ACC: [id number] EXAM:  CT CHEST IC   ORDERED BY: ETHAN PIERCE     PROCEDURE DATE:  05/06/2023          INTERPRETATION:  CLINICAL INFORMATION: Painless jaundice. Pancreatic head   mass on outside imaging.    COMPARISON: None.    CONTRAST/COMPLICATIONS:  IV Contrast: IV contrast documented in unlinked concurrent exam   (accession [id number]), Omnipaque 350 (accession [id number])  90 cc   administered   10 cc discarded  Oral Contrast: NONE  Complications: None reported at time of study completion    PROCEDURE:  CT of the Chest, Abdomen and Pelvis was performed.  Sagittal and coronal reformats were performed.    FINDINGS:  CHEST:  LUNGS AND LARGE AIRWAYS: Patent central airways. No pulmonary nodules.  PLEURA: No pleural effusion.  VESSELS: Within normal limits.  HEART: Heart size is normal. No pericardial effusion.  MEDIASTINUM AND DAMION: No lymphadenopathy.  CHEST WALL AND LOWER NECK: Within normal limits.    ABDOMEN AND PELVIS:  LIVER: Within normal limits.  BILE DUCTS: Intra and extrahepatic biliary ductal dilatation. The CBD   measures up to 18 mm with abrupt cut off at the level of the pancreatic   head.  GALLBLADDER: Contracted  SPLEEN: Within normal limits.  PANCREAS: Main pancreatic ductal dilatation measuring up to 7 mm.   Multiple soft tissue masses in the pancreas, measuring 2.0 cm in the tail   (2; 84), 1.9 cm in the body (2; 89) and 2.3 cm in the neck (2;   93).Evaluation for vascular involvement is limited by lack of arterial   phase imaging, however less than 180 degree contact with the SMV is   noted. There is no evidence of celiac or SMA involvement.  ADRENALS: Within normal limits.  KIDNEYS/URETERS: Within normal limits.    BLADDER: Within normal limits.  REPRODUCTIVE ORGANS: Uterus and adnexa within normal limits.    BOWEL: Colonic diverticulosis. No bowel wall thickening or inflammation.   Appendix not visualized.  PERITONEUM: No ascites or peritoneal implant.  VESSELS: There is no neoplastic involvement of the celiac axis or SMA.   Atherosclerotic changes notable.  RETROPERITONEUM/LYMPH NODES: No significant lymphadenopathy.  ABDOMINAL WALL: Within normal limits.  BONES: Degenerative changes. No aggressive osseous lesions.    IMPRESSION:  Intra-and extrahepatic biliary and pancreatic ductal dilatation secondary   to a mass in the pancreatic neck. Additional soft tissue pancreatic   masses noted in the body and tail suspicious for multifocal pancreatic   malignancy. Recommend MRI abdomen with/without IV contrast for further   characterization.

## 2023-05-07 NOTE — CONSULT NOTE ADULT - ATTENDING COMMENTS
68 year old female with PMHx of HTN presenting with concerns for painless jaundice. Was in [place] this past week when her daughter noticed that she was becoming jaundiced and having scleral icterus. Pt saw a general practitioner in [place] who did lab work and imaging demonstrating elevated T Bili and a pancreatic lesion. Patient left [place] and [place] back [place] the [place] to [place] further work up. Pt and daughter also note poor appetite over the last 6-8 weeks. Labs here notable for Ca 19-9 2780, CEA 2.8, Tbili 20.7, , , . CT A/P showing intra-and extrahepatic biliary and pancreatic ductal dilatation secondary to a mass in the pancreatic neck; additional soft tissue pancreatic masses noted in the body and tail suspicious for multifocal pancreatic malignancy. Denies nausea, vomiting, abdominal pain, fevers, chills, and night sweats. GI consulted for endoscopic eval of pancreatic mass.    Impression:  #Newly dx pancreas lesions in neck, body, and tail c/b intra-and extrahepatic biliary and pancreatic ductal dilatation   - Differential includes adenocarcinoma vs. neuroendocrine tumor      Recommendations:  - tentative plan for EUS + FNB +/- ERCP for ?stent placement (will discuss if with advanced team in AM if ducts are amenable to stenting)

## 2023-05-07 NOTE — CONSULT NOTE ADULT - ASSESSMENT
Impression:  #Newly dx pancreas neck mass causing Intra-and extrahepatic biliary and pancreatic ductal dilatation   - Differential includes adenocarcinoma vs. neuroendocrine tumor; less likely metaplastic lesion vs lymphoma      Recommendations:  - tentative plan for EUS + FNB +/- ERCP  - diet as tolerated for now  - please order labs (CBC, CMP, INR) for 6 AM so that results will be available early tomorrow morning; replete lytes and transfuse as needed  - NPO after midnight tonight  - antiemetics and pain control per primary team  - monitor CMP daily to trend hyperbilirubinemia    **THIS NOTE IS NOT FINALIZED UNTIL SIGNED BY THE ATTENDING**    Ella Willis MD  GI Fellow, PGY-5  Available via Microsoft Teams    NON-URGENT CONSULTS:  Please email [email or web address]  AT NIGHT AND ON WEEKENDS:  Contact on-call GI fellow via answering service ([phone]) from 5pm-8am and on weekends/holidays  MONDAY-FRIDAY 8AM-5PM:  Pager# [phone] ([facility]) or [phone] ([facility])  GI Phone# [phone] ([facility])   68 year old female with PMHx of HTN presenting with concerns for painless jaundice. Was in [place] this past week when her daughter noticed that she was becoming jaundiced and having scleral icterus. Pt saw a general practitioner in [place] who did lab work and imaging demonstrating elevated T Bili and a pancreatic lesion. Patient left [place] and [place] the [place] to expedite further work up. Pt and daughter also note poor appetite over the last 6-8 weeks. Labs here notable for Ca 19-9 2780, CEA 2.8, Tbili 20.7, , , . CT A/P showing intra-and extrahepatic biliary and pancreatic ductal dilatation secondary to a mass in the pancreatic neck; additional soft tissue pancreatic masses noted in the body and tail suspicious for multifocal pancreatic malignancy. Denies nausea, vomiting, abdominal pain, fevers, chills, and night sweats. GI consulted for endoscopic eval of pancreatic mass.    Impression:  #Newly dx pancreas lesions in neck, body, and tail c/b intra-and extrahepatic biliary and pancreatic ductal dilatation   - Differential includes adenocarcinoma vs. neuroendocrine tumor      Recommendations:  - tentative plan for EUS + FNB +/- ERCP for ?stent placement (will discuss if with advanced team in AM if ducts are amenable to stenting)  - diet as tolerated for now  - please order labs (CBC, CMP, INR) for 6 AM so that results will be available early tomorrow morning; replete lytes and transfuse as needed  - NPO after midnight tonight  - antiemetics and pain control per primary team  - monitor CMP daily to trend hyperbilirubinemia    **THIS NOTE IS NOT FINALIZED UNTIL SIGNED BY THE ATTENDING**    Ella Willis MD  GI Fellow, PGY-5  Available via Microsoft Teams    NON-URGENT CONSULTS:  Please email [email or web address]  AT NIGHT AND ON WEEKENDS:  Contact on-call GI fellow via answering service ([phone]) from 5pm-8am and on weekends/holidays  MONDAY-FRIDAY 8AM-5PM:  Pager# [phone] ([facility]) or [phone] ([facility])  GI Phone# [phone] ([facility])

## 2023-05-08 ENCOUNTER — RESULT REVIEW (OUTPATIENT)
Age: 68
End: 2023-05-08

## 2023-05-08 LAB
ALBUMIN SERPL ELPH-MCNC: 3.1 G/DL — LOW (ref 3.3–5)
ALP SERPL-CCNC: 878 U/L — HIGH (ref 40–120)
ALT FLD-CCNC: 452 U/L — HIGH (ref 10–45)
ANION GAP SERPL CALC-SCNC: 16 MMOL/L — SIGNIFICANT CHANGE UP (ref 5–17)
APTT BLD: 31.9 SEC — SIGNIFICANT CHANGE UP (ref 27.5–35.5)
AST SERPL-CCNC: 305 U/L — HIGH (ref 10–40)
BILIRUB DIRECT SERPL-MCNC: >10 MG/DL — HIGH (ref 0–0.3)
BILIRUB INDIRECT FLD-MCNC: <10.6 MG/DL — HIGH (ref 0.2–1)
BILIRUB SERPL-MCNC: 20.6 MG/DL — HIGH (ref 0.2–1.2)
BLD GP AB SCN SERPL QL: NEGATIVE — SIGNIFICANT CHANGE UP
BUN SERPL-MCNC: 12 MG/DL — SIGNIFICANT CHANGE UP (ref 7–23)
CALCIUM SERPL-MCNC: 9 MG/DL — SIGNIFICANT CHANGE UP (ref 8.4–10.5)
CHLORIDE SERPL-SCNC: 99 MMOL/L — SIGNIFICANT CHANGE UP (ref 96–108)
CO2 SERPL-SCNC: 21 MMOL/L — LOW (ref 22–31)
CREAT SERPL-MCNC: 0.58 MG/DL — SIGNIFICANT CHANGE UP (ref 0.5–1.3)
EGFR: 99 ML/MIN/1.73M2 — SIGNIFICANT CHANGE UP
GLUCOSE SERPL-MCNC: 139 MG/DL — HIGH (ref 70–99)
HCT VFR BLD CALC: 32 % — LOW (ref 34.5–45)
HGB BLD-MCNC: 11.4 G/DL — LOW (ref 11.5–15.5)
INR BLD: 1.41 RATIO — HIGH (ref 0.88–1.16)
MAGNESIUM SERPL-MCNC: 1.6 MG/DL — SIGNIFICANT CHANGE UP (ref 1.6–2.6)
MCHC RBC-ENTMCNC: 33.2 PG — SIGNIFICANT CHANGE UP (ref 27–34)
MCHC RBC-ENTMCNC: 35.6 GM/DL — SIGNIFICANT CHANGE UP (ref 32–36)
MCV RBC AUTO: 93.3 FL — SIGNIFICANT CHANGE UP (ref 80–100)
NRBC # BLD: 0 /100 WBCS — SIGNIFICANT CHANGE UP (ref 0–0)
PHOSPHATE SERPL-MCNC: 2.7 MG/DL — SIGNIFICANT CHANGE UP (ref 2.5–4.5)
PLATELET # BLD AUTO: 291 K/UL — SIGNIFICANT CHANGE UP (ref 150–400)
POTASSIUM SERPL-MCNC: 3.7 MMOL/L — SIGNIFICANT CHANGE UP (ref 3.5–5.3)
POTASSIUM SERPL-SCNC: 3.7 MMOL/L — SIGNIFICANT CHANGE UP (ref 3.5–5.3)
PROT SERPL-MCNC: 5.4 G/DL — LOW (ref 6–8.3)
PROTHROM AB SERPL-ACNC: 16.3 SEC — HIGH (ref 10.5–13.4)
RBC # BLD: 3.43 M/UL — LOW (ref 3.8–5.2)
RBC # FLD: 18.3 % — HIGH (ref 10.3–14.5)
RH IG SCN BLD-IMP: NEGATIVE — SIGNIFICANT CHANGE UP
SODIUM SERPL-SCNC: 136 MMOL/L — SIGNIFICANT CHANGE UP (ref 135–145)
WBC # BLD: 5.38 K/UL — SIGNIFICANT CHANGE UP (ref 3.8–10.5)
WBC # FLD AUTO: 5.38 K/UL — SIGNIFICANT CHANGE UP (ref 3.8–10.5)

## 2023-05-08 PROCEDURE — 88307 TISSUE EXAM BY PATHOLOGIST: CPT | Mod: 26

## 2023-05-08 PROCEDURE — 99223 1ST HOSP IP/OBS HIGH 75: CPT | Mod: GC,25

## 2023-05-08 PROCEDURE — 99232 SBSQ HOSP IP/OBS MODERATE 35: CPT | Mod: FS

## 2023-05-08 PROCEDURE — 43274 ERCP DUCT STENT PLACEMENT: CPT | Mod: GC

## 2023-05-08 PROCEDURE — 43239 EGD BIOPSY SINGLE/MULTIPLE: CPT | Mod: 59,GC

## 2023-05-08 PROCEDURE — 74177 CT ABD & PELVIS W/CONTRAST: CPT | Mod: 26

## 2023-05-08 PROCEDURE — 43242 EGD US FINE NEEDLE BX/ASPIR: CPT | Mod: GC

## 2023-05-08 DEVICE — AUTOTOME CANNULATING SPHINCTEROTOME RX 44 20MM: Type: IMPLANTABLE DEVICE | Status: FUNCTIONAL

## 2023-05-08 DEVICE — STENT ADVANIX BILIARY CTR BEND 10FRX9CM: Type: IMPLANTABLE DEVICE | Status: FUNCTIONAL

## 2023-05-08 DEVICE — CATH BLLN EXTRACT DIST GUIDE WIRE 15MM 3LUM: Type: IMPLANTABLE DEVICE | Status: FUNCTIONAL

## 2023-05-08 DEVICE — BLLN EXTRACT FUSION QUATRO 8.5 10 12 15MM: Type: IMPLANTABLE DEVICE | Status: FUNCTIONAL

## 2023-05-08 DEVICE — HYDRATOME 44: Type: IMPLANTABLE DEVICE | Status: FUNCTIONAL

## 2023-05-08 RX ORDER — ONDANSETRON 8 MG/1
4 TABLET, FILM COATED ORAL ONCE
Refills: 0 | Status: DISCONTINUED | OUTPATIENT
Start: 2023-05-08 | End: 2023-05-08

## 2023-05-08 RX ORDER — POTASSIUM PHOSPHATE, MONOBASIC POTASSIUM PHOSPHATE, DIBASIC 236; 224 MG/ML; MG/ML
30 INJECTION, SOLUTION INTRAVENOUS ONCE
Refills: 0 | Status: DISCONTINUED | OUTPATIENT
Start: 2023-05-08 | End: 2023-05-09

## 2023-05-08 RX ORDER — ONDANSETRON 8 MG/1
4 TABLET, FILM COATED ORAL ONCE
Refills: 0 | Status: COMPLETED | OUTPATIENT
Start: 2023-05-08 | End: 2023-05-08

## 2023-05-08 RX ORDER — ENOXAPARIN SODIUM 100 MG/ML
40 INJECTION SUBCUTANEOUS EVERY 24 HOURS
Refills: 0 | Status: DISCONTINUED | OUTPATIENT
Start: 2023-05-08 | End: 2023-05-09

## 2023-05-08 RX ORDER — MAGNESIUM SULFATE 500 MG/ML
2 VIAL (ML) INJECTION ONCE
Refills: 0 | Status: COMPLETED | OUTPATIENT
Start: 2023-05-08 | End: 2023-05-08

## 2023-05-08 RX ORDER — INDOMETHACIN 50 MG
100 CAPSULE ORAL ONCE
Refills: 0 | Status: DISCONTINUED | OUTPATIENT
Start: 2023-05-08 | End: 2023-05-09

## 2023-05-08 RX ORDER — CIPROFLOXACIN LACTATE 400MG/40ML
400 VIAL (ML) INTRAVENOUS ONCE
Refills: 0 | Status: COMPLETED | OUTPATIENT
Start: 2023-05-08 | End: 2023-05-08

## 2023-05-08 RX ADMIN — Medication 25 GRAM(S): at 11:31

## 2023-05-08 RX ADMIN — ONDANSETRON 4 MILLIGRAM(S): 8 TABLET, FILM COATED ORAL at 17:38

## 2023-05-08 RX ADMIN — Medication 200 MILLIGRAM(S): at 17:04

## 2023-05-08 RX ADMIN — LOSARTAN POTASSIUM 50 MILLIGRAM(S): 100 TABLET, FILM COATED ORAL at 05:37

## 2023-05-08 NOTE — DIETITIAN INITIAL EVALUATION ADULT - ENERGY INTAKE
NPO for procedure today (05/08). Previously ordered for Regular diet x 2 days with fair appetite/PO intake noted.   - Amenable to receiving oral nutritional supplement to optimize PO intake: Ensure Shake 2x/day   - Food preferences obtained; RD to honor as able.

## 2023-05-08 NOTE — DIETITIAN INITIAL EVALUATION ADULT - PHYSCIAL ASSESSMENT
Daughter unable to confirm UBW but endorses weight loss PTA x >1 months.     Weight Hx Per [facility]:  - 159 pounds (11/2022)  - 153 pounds (02/2023)    Current Admission Weights:  - Dosing weight: 145.1 pounds (05/06)    Weight Change:  - 9% weight loss x 6 months. Will continue to monitor weight trends as available/able.     IBW: 125 pounds   %IBW: 116%

## 2023-05-08 NOTE — DIETITIAN INITIAL EVALUATION ADULT - PERSON TAUGHT/METHOD
Discussed importance of adequate consumption of meals/supplements to optimize protein-energy intake. Encouraged small/frequent meals, nutrient dense snacks, prioritizing protein foods at meal time./verbal instruction/individual instruction/teach back - (Patient repeats in own words)/daughter instructed

## 2023-05-08 NOTE — DIETITIAN INITIAL EVALUATION ADULT - PERTINENT MEDS FT
MEDICATIONS  (STANDING):  ciprofloxacin   IVPB 400 milliGRAM(s) IV Intermittent once  indomethacin Suppository 100 milliGRAM(s) Rectal once  lactated ringers. 1000 milliLiter(s) (75 mL/Hr) IV Continuous <Continuous>  losartan 50 milliGRAM(s) Oral every 24 hours  potassium phosphate IVPB 30 milliMole(s) IV Intermittent once    MEDICATIONS  (PRN):

## 2023-05-08 NOTE — DIETITIAN INITIAL EVALUATION ADULT - CALCULATED FROM (CAL/KG)
Patient's father calls. Patient's umbilical stump fell off a couple days ago and they have noticed a small amount of blood from the umbilicus. He asks if they need to be concerned about this. No redness, purulent drainage or odor from umbilicus. Advised father a small amount of blood is not concerning. Monitor site for redness, prolonged bleeding, purulent drainage or odor and call back if these occur. Keep area clean and fold top of diaper down to avoid irritating the umbilicus.   [id number]

## 2023-05-08 NOTE — DIETITIAN INITIAL EVALUATION ADULT - REASON
Pt out of room for procedure, unable to perform nutrition focused physical examination at this time

## 2023-05-08 NOTE — DIETITIAN INITIAL EVALUATION ADULT - OTHER INFO
- GI: No GI distress reported at time of RD visit. Last BM: PTA. Bowel regimen: none.   - Renal: K+ Phos and Mg trending low today 05/08, s/p repletion. IVF LR @ 75ml/hr  - Abnormal LFT's noted in setting of obstructing pancreatic tumor

## 2023-05-08 NOTE — DIETITIAN INITIAL EVALUATION ADULT - ORAL INTAKE PTA/DIET HISTORY
Historian from daughter Serene via phone:  - Decreased appetite/PO intake x >1 month PTA, <75% Of usual intake  - NKFA/intolerances reported. No therapeutic/Yarsani restrictions.   - Micronutrient/Other supplementation: multivitamin, vitamin B12 occasionally   - Protein-energy supplementation: none

## 2023-05-08 NOTE — DIETITIAN INITIAL EVALUATION ADULT - REASON INDICATOR FOR ASSESSMENT
Consult ordered for: MST Score >/= 2 (2-13# weight loss PTA, poor appetite  PTA)  Source: electronic medical record, Pt daughter via phone (Serene)  * Pt out of room for procedure, RD unable to interview after multiple attempts

## 2023-05-08 NOTE — DIETITIAN NUTRITION RISK NOTIFICATION - TREATMENT: THE FOLLOWING DIET HAS BEEN RECOMMENDED
Diet, Regular (05-07-23 @ 12:06) [Active]  Diet, NPO after Midnight:      NPO Start Date: 07-May-2023,   NPO Start Time: 23:59 (05-07-23 @ 11:11) [Active]

## 2023-05-08 NOTE — DIETITIAN INITIAL EVALUATION ADULT - PERTINENT LABORATORY DATA
05-08    136  |  99  |  12  ----------------------------<  139<H>  3.7   |  21<L>  |  0.58    Ca    9.0      08 May 2023 07:20  Phos  2.7     05-08  Mg     1.6     05-08    TPro  5.4<L>  /  Alb  3.1<L>  /  TBili  20.6<H>  /  DBili  >10.0<H>  /  AST  305<H>  /  ALT  452<H>  /  AlkPhos  878<H>  05-08

## 2023-05-08 NOTE — PRE PROCEDURE NOTE - PRE PROCEDURE EVALUATION
Attending Physician:    Dr Doonhue                         Procedure: EUS/FNB, possible ERCP    Indication for Procedure: Pancreatic mass   ________________________________________________________  PAST MEDICAL & SURGICAL HISTORY:  Hypertension      History of appendectomy        ALLERGIES:  No Known Allergies    HOME MEDICATIONS:  losartan 50 mg oral tablet: 1 orally once a day    AICD/PPM: [ ] yes   [x ] no    PERTINENT LAB DATA:                        11.4   5.38  )-----------( 291      ( 08 May 2023 07:26 )             32.0     05-08    136  |  99  |  12  ----------------------------<  139<H>  3.7   |  21<L>  |  0.58    Ca    9.0      08 May 2023 07:20  Phos  2.7     05-08  Mg     1.6     05-08    TPro  5.4<L>  /  Alb  3.1<L>  /  TBili  20.6<H>  /  DBili  >10.0<H>  /  AST  305<H>  /  ALT  452<H>  /  AlkPhos  878<H>  05-08    PT/INR - ( 08 May 2023 07:29 )   PT: 16.3 sec;   INR: 1.41 ratio         PTT - ( 08 May 2023 07:29 )  PTT:31.9 sec            PHYSICAL EXAMINATION:    T(C): 36.8  HR: 76  BP: 142/76  RR: 18  SpO2: 94%    Constitutional: NAD    Neck:  No JVD  Respiratory: no respiratory distress   Cardiovascular: RRR  Extremities: No peripheral edema  Neurological: A/O x 3, no focal deficits        COMMENTS:    The patient is a suitable candidate for the planned procedure unless box checked [ ]  No, explain:

## 2023-05-08 NOTE — DIETITIAN INITIAL EVALUATION ADULT - NS FNS DIET ORDER
Vital Signs Last 24 Hrs  T(C): 36.8 (25 Feb 2020 03:13), Max: 37.2 (24 Feb 2020 18:49)  T(F): 98.2 (25 Feb 2020 03:13), Max: 99 (24 Feb 2020 18:49)  HR: 76 (25 Feb 2020 03:13) (76 - 92)  BP: 134/84 (25 Feb 2020 03:13) (134/84 - 177/96)  BP(mean): --  RR: 18 (25 Feb 2020 03:13) (16 - 18)  SpO2: 95% (25 Feb 2020 03:13) (95% - 99%)    PHYSICAL EXAM:  GENERAL: NAD, well-developed  HEAD:  Atraumatic, normocephalic  EYES: EOMI, conjunctiva and sclera clear  NECK: Supple, no JVD  CHEST/LUNG: Clear to auscultation bilaterally; no wheezing or rales  HEART: Regular rate and rhythm; no murmurs  ABDOMEN: Soft, nontender, nondistended; bowel sounds present  EXTREMITIES:  2+ Peripheral Pulses, RLE with warmth, tenderness, erythema with skin induration over medial aspect  PSYCH: calm affect, not anxious  NEUROLOGY: non-focal, AAOx3  SKIN: erythema, warmth and induration over RLE  MUSCULOSKELETAL: no back pain, moving all extremities
Diet, Regular (05-07-23 @ 12:06)  Diet, NPO after Midnight:      NPO Start Date: 07-May-2023,   NPO Start Time: 23:59 (05-07-23 @ 11:11)

## 2023-05-08 NOTE — CHART NOTE - NSCHARTNOTEFT_GEN_A_CORE
Post Procedure Note  Patient: LIANNA ORTEGA 68y (1955) Female   MRN: [id number]  Location: [facility] 2MON 210 W1  Visit: 05-06-23 Inpatient  Date: 05-08-23 @ 18:49    Procedure: S/P ERCP 2/2 obstructive jaundice d/t pancreatic mass    Subjective: Patient seen and examined at bedside. Endorsing some nausea, s/p x1 Zofran 4mg IV. Denies fevers, chills, chest pain, dyspnea, headache, emesis.       Objective:  Vitals: T(F): 98.3 (05-08-23 @ 18:00), Max: 99 (05-07-23 @ 20:22)  HR: 70 (05-08-23 @ 18:00)  BP: 136/86 (05-08-23 @ 18:00) (119/71 - 171/91)  RR: 18 (05-08-23 @ 18:00)  SpO2: 96% (05-08-23 @ 18:00)  Vent Settings:     In:   05-07-23 @ 07:01  -  05-08-23 @ 07:00  --------------------------------------------------------  IN: 825 mL    05-08-23 @ 07:01  -  05-08-23 @ 18:49  --------------------------------------------------------  IN: 0 mL      IV Fluids: lactated ringers. 1000 milliLiter(s) (75 mL/Hr) IV Continuous <Continuous>  potassium phosphate IVPB 30 milliMole(s) IV Intermittent once      Out:   05-07-23 @ 07:01  -  05-08-23 @ 07:00  --------------------------------------------------------  OUT: 0 mL    05-08-23 @ 07:01  -  05-08-23 @ 18:49  --------------------------------------------------------  OUT: 600 mL      EBL:     Voided Urine:   05-07-23 @ 07:01  -  05-08-23 @ 07:00  --------------------------------------------------------  OUT: 0 mL    05-08-23 @ 07:01  -  05-08-23 @ 18:49  --------------------------------------------------------  OUT: 600 mL      Simms Catheter: no        Physical Examination:  General: NAD, resting comfortably in bed, jaundiced  HEENT: Normocephalic atraumatic  Respiratory: Nonlabored respirations, normal CW expansion.  Cardio: S1S2, regular rate and rhythm.  Abdomen: soft, nondistended, NTTP  Vascular: extremities are warm and well perfused.     Imaging:  No post-op imaging studies    Assessment:  68yFemale patient S/P ERCP for pancreatic mass w/ obstructive jaundice.    Plan:    - Pain control/antiemetics PRN  - Diet: CLD, can advance in AM per GI  - Activity: as tolerated  - DVT ppx    Date/Time: 05-08-23 @ 18:49

## 2023-05-09 ENCOUNTER — TRANSCRIPTION ENCOUNTER (OUTPATIENT)
Age: 68
End: 2023-05-09

## 2023-05-09 VITALS
RESPIRATION RATE: 18 BRPM | TEMPERATURE: 98 F | SYSTOLIC BLOOD PRESSURE: 128 MMHG | HEART RATE: 79 BPM | DIASTOLIC BLOOD PRESSURE: 75 MMHG | OXYGEN SATURATION: 95 %

## 2023-05-09 LAB
ALBUMIN SERPL ELPH-MCNC: 2.9 G/DL — LOW (ref 3.3–5)
ALP SERPL-CCNC: 779 U/L — HIGH (ref 40–120)
ALT FLD-CCNC: 335 U/L — HIGH (ref 10–45)
ANION GAP SERPL CALC-SCNC: 13 MMOL/L — SIGNIFICANT CHANGE UP (ref 5–17)
AST SERPL-CCNC: 144 U/L — HIGH (ref 10–40)
BILIRUB DIRECT SERPL-MCNC: 6.1 MG/DL — HIGH (ref 0–0.3)
BILIRUB INDIRECT FLD-MCNC: 3.3 MG/DL — HIGH (ref 0.2–1)
BILIRUB SERPL-MCNC: 9.4 MG/DL — HIGH (ref 0.2–1.2)
BUN SERPL-MCNC: 10 MG/DL — SIGNIFICANT CHANGE UP (ref 7–23)
CALCIUM SERPL-MCNC: 8.5 MG/DL — SIGNIFICANT CHANGE UP (ref 8.4–10.5)
CHLORIDE SERPL-SCNC: 100 MMOL/L — SIGNIFICANT CHANGE UP (ref 96–108)
CO2 SERPL-SCNC: 22 MMOL/L — SIGNIFICANT CHANGE UP (ref 22–31)
CREAT SERPL-MCNC: 0.66 MG/DL — SIGNIFICANT CHANGE UP (ref 0.5–1.3)
EGFR: 96 ML/MIN/1.73M2 — SIGNIFICANT CHANGE UP
GLUCOSE SERPL-MCNC: 122 MG/DL — HIGH (ref 70–99)
HCT VFR BLD CALC: 31.1 % — LOW (ref 34.5–45)
HGB BLD-MCNC: 11 G/DL — LOW (ref 11.5–15.5)
MAGNESIUM SERPL-MCNC: 1.8 MG/DL — SIGNIFICANT CHANGE UP (ref 1.6–2.6)
MCHC RBC-ENTMCNC: 33 PG — SIGNIFICANT CHANGE UP (ref 27–34)
MCHC RBC-ENTMCNC: 35.4 GM/DL — SIGNIFICANT CHANGE UP (ref 32–36)
MCV RBC AUTO: 93.4 FL — SIGNIFICANT CHANGE UP (ref 80–100)
NRBC # BLD: 0 /100 WBCS — SIGNIFICANT CHANGE UP (ref 0–0)
PHOSPHATE SERPL-MCNC: 4.1 MG/DL — SIGNIFICANT CHANGE UP (ref 2.5–4.5)
PLATELET # BLD AUTO: 306 K/UL — SIGNIFICANT CHANGE UP (ref 150–400)
POTASSIUM SERPL-MCNC: 4 MMOL/L — SIGNIFICANT CHANGE UP (ref 3.5–5.3)
POTASSIUM SERPL-SCNC: 4 MMOL/L — SIGNIFICANT CHANGE UP (ref 3.5–5.3)
PROT SERPL-MCNC: 5.3 G/DL — LOW (ref 6–8.3)
RBC # BLD: 3.33 M/UL — LOW (ref 3.8–5.2)
RBC # FLD: 17.8 % — HIGH (ref 10.3–14.5)
SODIUM SERPL-SCNC: 135 MMOL/L — SIGNIFICANT CHANGE UP (ref 135–145)
WBC # BLD: 6.82 K/UL — SIGNIFICANT CHANGE UP (ref 3.8–10.5)
WBC # FLD AUTO: 6.82 K/UL — SIGNIFICANT CHANGE UP (ref 3.8–10.5)

## 2023-05-09 PROCEDURE — 85610 PROTHROMBIN TIME: CPT

## 2023-05-09 PROCEDURE — 80048 BASIC METABOLIC PNL TOTAL CA: CPT

## 2023-05-09 PROCEDURE — 82947 ASSAY GLUCOSE BLOOD QUANT: CPT

## 2023-05-09 PROCEDURE — 86901 BLOOD TYPING SEROLOGIC RH(D): CPT

## 2023-05-09 PROCEDURE — 82330 ASSAY OF CALCIUM: CPT

## 2023-05-09 PROCEDURE — 99232 SBSQ HOSP IP/OBS MODERATE 35: CPT | Mod: GC

## 2023-05-09 PROCEDURE — 84100 ASSAY OF PHOSPHORUS: CPT

## 2023-05-09 PROCEDURE — 74177 CT ABD & PELVIS W/CONTRAST: CPT

## 2023-05-09 PROCEDURE — C1769: CPT

## 2023-05-09 PROCEDURE — 84132 ASSAY OF SERUM POTASSIUM: CPT

## 2023-05-09 PROCEDURE — C2625: CPT

## 2023-05-09 PROCEDURE — 85025 COMPLETE CBC W/AUTO DIFF WBC: CPT

## 2023-05-09 PROCEDURE — 82247 BILIRUBIN TOTAL: CPT

## 2023-05-09 PROCEDURE — 36415 COLL VENOUS BLD VENIPUNCTURE: CPT

## 2023-05-09 PROCEDURE — 86850 RBC ANTIBODY SCREEN: CPT

## 2023-05-09 PROCEDURE — 83605 ASSAY OF LACTIC ACID: CPT

## 2023-05-09 PROCEDURE — 82378 CARCINOEMBRYONIC ANTIGEN: CPT

## 2023-05-09 PROCEDURE — 84295 ASSAY OF SERUM SODIUM: CPT

## 2023-05-09 PROCEDURE — 80053 COMPREHEN METABOLIC PANEL: CPT

## 2023-05-09 PROCEDURE — 85014 HEMATOCRIT: CPT

## 2023-05-09 PROCEDURE — 82803 BLOOD GASES ANY COMBINATION: CPT

## 2023-05-09 PROCEDURE — 88307 TISSUE EXAM BY PATHOLOGIST: CPT

## 2023-05-09 PROCEDURE — 86900 BLOOD TYPING SEROLOGIC ABO: CPT

## 2023-05-09 PROCEDURE — 82435 ASSAY OF BLOOD CHLORIDE: CPT

## 2023-05-09 PROCEDURE — 83690 ASSAY OF LIPASE: CPT

## 2023-05-09 PROCEDURE — 71260 CT THORAX DX C+: CPT | Mod: MB

## 2023-05-09 PROCEDURE — 86803 HEPATITIS C AB TEST: CPT

## 2023-05-09 PROCEDURE — 99285 EMERGENCY DEPT VISIT HI MDM: CPT

## 2023-05-09 PROCEDURE — 82248 BILIRUBIN DIRECT: CPT

## 2023-05-09 PROCEDURE — 85730 THROMBOPLASTIN TIME PARTIAL: CPT

## 2023-05-09 PROCEDURE — 83735 ASSAY OF MAGNESIUM: CPT

## 2023-05-09 PROCEDURE — 85018 HEMOGLOBIN: CPT

## 2023-05-09 PROCEDURE — 86301 IMMUNOASSAY TUMOR CA 19-9: CPT

## 2023-05-09 PROCEDURE — 80076 HEPATIC FUNCTION PANEL: CPT

## 2023-05-09 PROCEDURE — 74330 X-RAY BILE/PANC ENDOSCOPY: CPT

## 2023-05-09 PROCEDURE — 85027 COMPLETE CBC AUTOMATED: CPT

## 2023-05-09 RX ADMIN — LOSARTAN POTASSIUM 50 MILLIGRAM(S): 100 TABLET, FILM COATED ORAL at 06:33

## 2023-05-09 NOTE — DISCHARGE NOTE PROVIDER - HOSPITAL COURSE
Patient is a 68 year old female with PMHx of HTN presenting with concerns for painless jaundice. Was in [place] this past week when her daughter noticed that she was becoming yellow and having scleral icterus. She saw a general practitioner in [place] who did lab work and imaging demonstrating elevated T Bili and a pancreatic lesion. Patient presents to ED for further work up. Denies nausea, vomiting, changes in bowel movements, abdominal  pain, fevers, chills, and night sweats.    Surgical history of open appendectomy.  Family history significant for breast cancer in sister and mother, as well as another sister with cervical cancer.    In the ED, patient had CT scan that revealed Intra-and extrahepatic biliary and pancreatic ductal dilatation secondary to a mass in the pancreatic neck. Additional soft tissue pancreatic masses noted in the body and tail suspicious for multifocal pancreatic malignancy. Recommend MRI abdomen with/without IV contrast for further characterization.  T. bili was elevated to 20, along with other liver function tests.      Patient was admitted to surgical oncology service for further management.  Gastroenterology was consulted and patient underwent EUS/FNB of pancreatic mass + ERCP with plastic CBD stent placement on 5/8/23.  Post procedure liver function tests downtrended, as well as t. bili to 9.4.     On day of discharge, patient was tolerating regular, pain was without pain and she was ambulatory.    She is to follow up outpatient with [facility], her primary care doctor as well as gastroenterology.

## 2023-05-09 NOTE — DISCHARGE NOTE NURSING/CASE MANAGEMENT/SOCIAL WORK - PATIENT PORTAL LINK FT
You can access the FollowMyHealth Patient Portal offered by [facility] by registering at the following website: [email or web address]. By joining [facility]’s FollowMyHealth portal, you will also be able to view your health information using other applications (apps) compatible with our system.

## 2023-05-09 NOTE — DISCHARGE NOTE PROVIDER - CARE PROVIDER_API CALL
Alejandro Donohue)  Gastroenterology; Internal Medicine  [address] [place] [address] [address]  [place] [address]  Phone: [phone]  Fax: [phone]  Follow Up Time:     Cruzito Hoskins)  Surgery  [address] [place] [id number]  Phone: [phone]  Fax: [phone]  Follow Up Time:

## 2023-05-09 NOTE — PROGRESS NOTE ADULT - SUBJECTIVE AND OBJECTIVE BOX
GENERAL SURGERY PROGRESS NOTE    SUBJECTIVE  No acute events overnight. Feeling well on morning rounds.     10-point review of systems completed and negative except as noted above.      OBJECTIVE    MEDICATIONS  enoxaparin Injectable 40 milliGRAM(s) SubCutaneous every 24 hours  lactated ringers. 1000 milliLiter(s) IV Continuous <Continuous>  losartan 50 milliGRAM(s) Oral every 24 hours      PHYSICAL EXAM  T(C): 37 (05-07-23 @ 05:56), Max: 37.6 (05-07-23 @ 00:52)  HR: 79 (05-07-23 @ 05:56) (74 - 90)  BP: 144/83 (05-07-23 @ 05:56) (121/63 - 160/89)  RR: 18 (05-07-23 @ 05:56) (16 - 18)  SpO2: 96% (05-07-23 @ 05:56) (96% - 99%)    05-06-23 @ 07:01  -  05-07-23 @ 07:00  --------------------------------------------------------  IN: 375 mL / OUT: 0 mL / NET: 375 mL        Physical Exam: Gen: NAD, jaundiced  HEENT: Scleral icterus present  CV: Regular rate  Resp: Nonlabored breathing on room air  Abd: Soft, nondistended, nontender  Ext: Warm    LABS                        11.8   5.26  )-----------( 305      ( 07 May 2023 07:22 )             33.0     05-07    136  |  100  |  13  ----------------------------<  106<H>  3.6   |  21<L>  |  0.56    Ca    9.3      07 May 2023 07:23  Phos  3.1     05-07  Mg     1.7     05-07    TPro  5.6<L>  /  Alb  3.2<L>  /  TBili  20.7<H>  /  DBili  x   /  AST  289<H>  /  ALT  481<H>  /  AlkPhos  911<H>  05-07          RADIOLOGY & ADDITIONAL STUDIES  
RED TEAM SURGERY DAILY PROGRESS NOTE    SUBJECTIVE:     Overnight: no acute events   Patient seen and evaluated on AM rounds. No concerns. Aware of plan for GI procedure today   Patient otherwise denies nausea, vomiting, chest pain, shortness of breath     OBJECTIVE:  Vital Signs Last 24 Hrs  T(C): 37.2 (08 May 2023 05:38), Max: 37.3 (07 May 2023 16:28)  T(F): 98.9 (08 May 2023 05:38), Max: 99.1 (07 May 2023 16:28)  HR: 78 (08 May 2023 05:38) (76 - 81)  BP: 129/75 (08 May 2023 05:38) (117/68 - 151/79)  RR: 18 (08 May 2023 05:38) (18 - 18)  SpO2: 95% (08 May 2023 05:38) (95% - 98%)    Parameters below as of 08 May 2023 05:38  Patient On (Oxygen Delivery Method): room air      Daily     Daily   DARWIN:      Chest Tube:      NG Tube:       05-07  -  05-08  --------------------------------------------------------  IN:    Lactated Ringers: 825 mL  Total IN: 825 mL    OUT:  Total OUT: 0 mL        STANDING  lactated ringers. 1000 milliLiter(s) (75 mL/Hr) IV Continuous <Continuous>  losartan 50 milliGRAM(s) Oral every 24 hours    PRN      Labs:  136  |  100  |  13  ----------------------------<  106<H>    (05-07)  3.6   |  21<L>  |  0.56            Ca    9.3      05-07  Mg    1.7  Phos  3.1          PT: 16.3 sec     05-08  aPTT: 31.9 sec   INR: 1.41 ratio             Physical Exam:  General: well developed, well nourished, NAD. Jaundiced, scleral icterus   Cardiovascular: appears well perfused   Respiratory: respirations non labored  Gastrointestinal: soft, nontender, nondistended  Extremities: FROM, warm  Neurological: A+Ox3   
SURGERY DAILY PROGRESS NOTE:         SUBJECTIVE/ROS: Patient seen and examined at bedside. No events overnight. Denies nausea, vomiting, abdominal pain, chest pain, shortness of breath         MEDICATIONS  (STANDING):  enoxaparin Injectable 40 milliGRAM(s) SubCutaneous every 24 hours  indomethacin Suppository 100 milliGRAM(s) Rectal once  losartan 50 milliGRAM(s) Oral every 24 hours  potassium phosphate IVPB 30 milliMole(s) IV Intermittent once    MEDICATIONS  (PRN):      OBJECTIVE:    Vital Signs Last 24 Hrs  T(C): 37 (09 May 2023 06:19), Max: 37.2 (08 May 2023 11:59)  T(F): 98.6 (09 May 2023 06:19), Max: 98.9 (08 May 2023 11:59)  HR: 71 (09 May 2023 06:19) (66 - 87)  BP: 124/71 (09 May 2023 06:19) (122/70 - 171/91)  BP(mean): --  RR: 18 (09 May 2023 06:19) (15 - 18)  SpO2: 95% (09 May 2023 06:19) (94% - 99%)    Parameters below as of 09 May 2023 06:19  Patient On (Oxygen Delivery Method): room air            I&O's Detail    08 May 2023 07:01  -  09 May 2023 07:00  --------------------------------------------------------  IN:    Lactated Ringers: 900 mL  Total IN: 900 mL    OUT:    Oral Fluid: 0 mL    Voided (mL): 2300 mL  Total OUT: 2300 mL    Total NET: -1400 mL      Physical Exam:  General: well developed, well nourished, NAD. Jaundiced, scleral icterus   CV: S1, S2  Respiratory: respirations non labored  Gastrointestinal: soft, nontender, nondistended  Extremities: FROM, warm  Neurological: A+Ox3         LABS:                        11.4   5.38  )-----------( 291      ( 08 May 2023 07:26 )             32.0     05-08    136  |  99  |  12  ----------------------------<  139<H>  3.7   |  21<L>  |  0.58    Ca    9.0      08 May 2023 07:20  Phos  2.7     05-08  Mg     1.6     05-08    TPro  5.4<L>  /  Alb  3.1<L>  /  TBili  20.6<H>  /  DBili  >10.0<H>  /  AST  305<H>  /  ALT  452<H>  /  AlkPhos  878<H>  05-08    PT/INR - ( 08 May 2023 07:29 )   PT: 16.3 sec;   INR: 1.41 ratio         PTT - ( 08 May 2023 07:29 )  PTT:31.9 sec      
  Interval Events:   NAEON, afebrile HD stable   EUS/FNB of pancreatic mass + ERCP with plastic CBD stent placement (5/8/23)  Denies abd pain, n/v    Allergies:  No Known Allergies        Hospital Medications:  enoxaparin Injectable 40 milliGRAM(s) SubCutaneous every 24 hours  indomethacin Suppository 100 milliGRAM(s) Rectal once  losartan 50 milliGRAM(s) Oral every 24 hours  potassium phosphate IVPB 30 milliMole(s) IV Intermittent once      PMHX/PSHX:  Hypertension    No significant past surgical history    History of appendectomy        Family history:      ROS:     General:  No wt loss, fevers, chills, night sweats, fatigue,   Eyes:  Good vision, no reported pain  ENT:  No sore throat, pain, runny nose, dysphagia  CV:  No pain, palpitations, hypo/hypertension  Pulm:  No dyspnea, cough, tachypnea, wheezing  GI:  see above  :  No pain, bleeding, incontinence, nocturia  Muscle:  No pain, weakness  Neuro:  No weakness, tingling, memory problems  Psych:  No fatigue, insomnia, mood problems, depression  Endocrine:  No polyuria, polydipsia, cold/heat intolerance  Heme:  No petechiae, ecchymosis, easy bruisability  Skin:  No rash, tattoos, scars, edema      PHYSICAL EXAM:   Vital Signs:  Vital Signs Last 24 Hrs  T(C): 37 (09 May 2023 06:19), Max: 37.2 (08 May 2023 11:59)  T(F): 98.6 (09 May 2023 06:19), Max: 98.9 (08 May 2023 11:59)  HR: 71 (09 May 2023 06:19) (66 - 87)  BP: 124/71 (09 May 2023 06:19) (122/70 - 171/91)  BP(mean): --  RR: 18 (09 May 2023 06:19) (15 - 18)  SpO2: 95% (09 May 2023 06:19) (94% - 99%)    Parameters below as of 09 May 2023 06:19  Patient On (Oxygen Delivery Method): room air      Daily Height in cm: 165.1 (08 May 2023 12:12)    Daily     GENERAL:  Appears stated age, well-groomed, +chronically ill appearing; no distress  HEENT:  NC/AT,  +scleral icterus  CHEST:  Full & symmetric excursion, no increased effort  HEART:  Regular rhythm, S1, S2, no murmur/rub/S3/S4  ABDOMEN:  Soft, non-tender, non-distended  EXTREMITIES:  no cyanosis,clubbing or edema  SKIN:  +jaundice  NEURO:  Alert, orientedx3    LABS:                        11.0   6.82  )-----------( 306      ( 09 May 2023 07:33 )             31.1     Mean Cell Volume: 93.4 fl (05-09-23 @ 07:33)    05-09    135  |  100  |  10  ----------------------------<  122<H>  4.0   |  22  |  0.66    Ca    8.5      09 May 2023 07:33  Phos  4.1     05-09  Mg     1.8     05-09    TPro  5.3<L>  /  Alb  2.9<L>  /  TBili  9.4<H>  /  DBili  6.1<H>  /  AST  144<H>  /  ALT  335<H>  /  AlkPhos  779<H>  05-09    LIVER FUNCTIONS - ( 09 May 2023 07:33 )  Alb: 2.9 g/dL / Pro: 5.3 g/dL / ALK PHOS: 779 U/L / ALT: 335 U/L / AST: 144 U/L / GGT: x           PT/INR - ( 08 May 2023 07:29 )   PT: 16.3 sec;   INR: 1.41 ratio         PTT - ( 08 May 2023 07:29 )  PTT:31.9 sec                            11.0   6.82  )-----------( 306      ( 09 May 2023 07:33 )             31.1                         11.4   5.38  )-----------( 291      ( 08 May 2023 07:26 )             32.0                         11.8   5.26  )-----------( 305      ( 07 May 2023 07:22 )             33.0                         11.9   7.00  )-----------( 302      ( 06 May 2023 12:25 )             33.9       Imaging:

## 2023-05-09 NOTE — DISCHARGE NOTE PROVIDER - NSDCFUADDAPPT_GEN_ALL_CORE_FT
Please follow up with the [facility]    [address]   [facility] [address]  [phone]

## 2023-05-09 NOTE — PROGRESS NOTE ADULT - PROVIDER SPECIALTY LIST ADULT
Study: Wave Writer- BRUNO Study: A Randomized Controlled Study to Evaluate the Safety and Effectiveness of Ailvxing net Spinal Cord Stimulation (SCS) Systems in the Treatment of Chronic Low Back and/or Leg Pain with No Prior Surgeries  IRB/Protocol #: [id number]/  : Obed Gasca MD  Treating Physician: Shoaib Aguirre MD  Site Number: [id number]        Subject Number: G011    SCS TRIAL:  Patient came to [facility] for SCS Trial. Patient met with Dr. Shoaib Aguirre and Akila Cali ([facility]). There were no changes in concomitant medications,no protocol deviations or adverse events to report.  
Surgery
Gastroenterology
Surgery
Surgery

## 2023-05-09 NOTE — DISCHARGE NOTE PROVIDER - CARE PROVIDERS DIRECT ADDRESSES
[email or web address]

## 2023-05-09 NOTE — PROGRESS NOTE ADULT - REASON FOR ADMISSION
New finding of pancreatic tumor

## 2023-05-09 NOTE — PROGRESS NOTE ADULT - ATTENDING COMMENTS
68F PMHx of HTN p/w painless jaundice. 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   -- Labs here notable for Ca 19-9 2780, CEA 2.8, Tbili 20.7, , , . -- CT A/P showing intra-and extrahepatic biliary and pancreatic ductal dilatation secondary to a mass in the pancreatic neck; additional soft tissue pancreatic masses noted in the body and tail suspicious for multifocal pancreatic malignancy  --EGD/EUS/ERCP:  There was dilation in intra and extrahepatic bile ducts. Multiple complext pancreatic cysts seen concerning for malignancy. Stent placed in CBD with mucinous drainage.      Recommendations:   - Trend liver enzymes, CBC  - Ok to advance diet as tolerated  - Follow up path
Biliary obstruction w jaundice    GI for ERCP/EUS    D/w pt
68-year-old female with new finding of multifocal pancreatic tumor. Obstructing tumor seen with elevated T Bili, transaminitis, and ductal dilation on imaging.    Plan:  - Appreciate GI recs      - ERCP/EUS today   - Diet: NPO for GI   - IVF: LR @75  - DVT ppx: held for GI  - Dispo: pending     __________________________________________    F/u EUS/ERCP  F/u path results  Will have pt f/u in office and through PMDC

## 2023-05-09 NOTE — PROGRESS NOTE ADULT - ASSESSMENT
68F PMHx of HTN p/w painless jaundice. Found to have multifocal pancreatic malignancy c/b biliary obstruction s/p EUS/FNB of pancreatic lesion + ERCP with plastic CBD stent placement       Impression:   #Multifocal pancreatic malignancy c/b biliary obstruction s/p EUS/FNB of pancreatic lesion + ERCP with plastic CBD stent placement (5/8/23)  -- pt developed painless jaundice --> saw a general practitioner in [place] who did lab work and imaging demonstrating elevated T Bili and a pancreatic lesion.   -- Labs here notable for Ca 19-9 2780, CEA 2.8, Tbili 20.7, , , . -- CT A/P showing intra-and extrahepatic biliary and pancreatic ductal dilatation secondary to a mass in the pancreatic neck; additional soft tissue pancreatic masses noted in the body and tail suspicious for multifocal pancreatic malignancy  --EGD/EUS/ERCP:  There was dilation in intra and extrahepatic bile ducts. Multiple complext pancreatic cysts seen concerning for malignancy. Stent placed in CBD with mucinous drainage.      Recommendations:   - Trend liver enzymes, CBC  - Ok to advance diet as tolerated  - Follow up path           Thank you for involving us in the care of this patient, please reach out if any further questions.     Milan Olivares MD  Gastroenterology/Hepatology Fellow, PGY6    Available on Microsoft Teams  [phone] ([facility])  [id number] ([facility])  Please contact on call fellow weekdays after 5pm-7am and weekends: [phone]          
68-year-old female with new finding of multifocal pancreatic tumor. Obstructing tumor seen with elevated T Bili, transaminitis, and ductal dilation on imaging.    Plan:  - Appreciate GI recs regarding ERCP/EUS  - F/u CEA,   - Will need tissue diagnosis  - Regular diet     [facility]  [id number]  
68-year-old female with new finding of multifocal pancreatic tumor. Obstructing tumor seen with elevated T Bili, transaminitis, and ductal dilation on imaging.    Plan:  - Appreciate GI recs:  s/p ERCP/EUS pending final report   - f/u AM labs and replete prn   - Diet: regular diet   - DVT ppx  - Dispo: pending     [facility]  p9002  
68-year-old female with new finding of multifocal pancreatic tumor. Obstructing tumor seen with elevated T Bili, transaminitis, and ductal dilation on imaging.    Plan:  - Appreciate GI recs      - ERCP/EUS today   - Diet: NPO for GI   - IVF: LR @75  - DVT ppx: held for GI  - Dispo: pending     [facility]  [id number]

## 2023-05-09 NOTE — DISCHARGE NOTE PROVIDER - DETAILS OF MALNUTRITION DIAGNOSIS/DIAGNOSES
This patient has been assessed with a concern for Malnutrition and was treated during this hospitalization for the following Nutrition diagnosis/diagnoses:     -  05/08/2023: Mild protein-calorie malnutrition

## 2023-05-09 NOTE — DISCHARGE NOTE PROVIDER - NSDCCPCAREPLAN_GEN_ALL_CORE_FT
PRINCIPAL DISCHARGE DIAGNOSIS  Diagnosis: Pancreatic mass  Assessment and Plan of Treatment: You had an EUS/FNB of pancreatic mass + ERCP with plastic CBD stent placement on 5/8/23.   [facility]  They have been emailed and will be reaching out to schedule an appointment.    If not, please call to make an appointment.   Please follow up outpatient with gastroenterology.  Please follow up with your primary care provider.

## 2023-05-09 NOTE — PROGRESS NOTE ADULT - NUTRITIONAL ASSESSMENT
This patient has been assessed with a concern for Malnutrition and has been determined to have a diagnosis/diagnoses of Mild protein-calorie malnutrition.    This patient is being managed with:   Diet Regular-  Entered: May  9 2023  6:53AM    Diet Regular-  Supplement Feeding Modality:  Oral  Ensure Plus High Protein Cans or Servings Per Day:  2       Frequency:  Daily  Entered: May  8 2023  3:33PM    The following pending diet order is being considered for treatment of Mild protein-calorie malnutrition:null

## 2023-05-10 ENCOUNTER — NON-APPOINTMENT (OUTPATIENT)
Age: 68
End: 2023-05-10

## 2023-05-10 RX ORDER — ACETAMINOPHEN 325 MG
TABLET ORAL
Refills: 0 | Status: COMPLETED | COMMUNITY
End: 2023-05-10

## 2023-05-10 RX ORDER — MULTIVITAMIN
CAPSULE ORAL
Refills: 0 | Status: COMPLETED | COMMUNITY
End: 2023-05-10

## 2023-05-10 RX ORDER — PANTOPRAZOLE 40 MG/1
40 TABLET, DELAYED RELEASE ORAL
Qty: 30 | Refills: 5 | Status: COMPLETED | COMMUNITY
Start: 2023-04-18 | End: 2023-05-10

## 2023-05-10 RX ORDER — PNV NO.95/FERROUS FUM/FOLIC AC 28MG-0.8MG
TABLET ORAL
Refills: 0 | Status: COMPLETED | COMMUNITY
End: 2023-05-10

## 2023-05-15 ENCOUNTER — APPOINTMENT (OUTPATIENT)
Dept: RADIOLOGY | Facility: CLINIC | Age: 68
End: 2023-05-15
Payer: MEDICARE

## 2023-05-15 ENCOUNTER — APPOINTMENT (OUTPATIENT)
Dept: MRI IMAGING | Facility: CLINIC | Age: 68
End: 2023-05-15
Payer: MEDICARE

## 2023-05-15 ENCOUNTER — OUTPATIENT (OUTPATIENT)
Dept: OUTPATIENT SERVICES | Facility: HOSPITAL | Age: 68
LOS: 1 days | End: 2023-05-15
Payer: MEDICARE

## 2023-05-15 ENCOUNTER — LABORATORY RESULT (OUTPATIENT)
Age: 68
End: 2023-05-15

## 2023-05-15 DIAGNOSIS — K86.89 OTHER SPECIFIED DISEASES OF PANCREAS: ICD-10-CM

## 2023-05-15 LAB
ALBUMIN SERPL ELPH-MCNC: 4.1 G/DL
ALP BLD-CCNC: 434 U/L
ALT SERPL-CCNC: 168 U/L
ANION GAP SERPL CALC-SCNC: 12 MMOL/L
AST SERPL-CCNC: 80 U/L
BILIRUB SERPL-MCNC: 4.6 MG/DL
BUN SERPL-MCNC: 9 MG/DL
CALCIUM SERPL-MCNC: 9.5 MG/DL
CANCER AG19-9 SERPL-ACNC: 651 U/ML
CEA SERPL-MCNC: 2.7 NG/ML
CHLORIDE SERPL-SCNC: 105 MMOL/L
CO2 SERPL-SCNC: 26 MMOL/L
CREAT SERPL-MCNC: 0.58 MG/DL
EGFR: 99 ML/MIN/1.73M2
ESTIMATED AVERAGE GLUCOSE: 100 MG/DL
GLUCOSE SERPL-MCNC: 158 MG/DL
HBA1C MFR BLD HPLC: 5.1 %
POTASSIUM SERPL-SCNC: 5 MMOL/L
PROT SERPL-MCNC: 6.5 G/DL
SODIUM SERPL-SCNC: 144 MMOL/L

## 2023-05-15 PROCEDURE — 74018 RADEX ABDOMEN 1 VIEW: CPT | Mod: 26

## 2023-05-15 PROCEDURE — A9585: CPT

## 2023-05-15 PROCEDURE — 74183 MRI ABD W/O CNTR FLWD CNTR: CPT

## 2023-05-15 PROCEDURE — 74183 MRI ABD W/O CNTR FLWD CNTR: CPT | Mod: 26,MH

## 2023-05-15 PROCEDURE — 74018 RADEX ABDOMEN 1 VIEW: CPT

## 2023-05-15 PROCEDURE — 71046 X-RAY EXAM CHEST 2 VIEWS: CPT | Mod: 26

## 2023-05-15 PROCEDURE — 71046 X-RAY EXAM CHEST 2 VIEWS: CPT

## 2023-05-16 ENCOUNTER — NON-APPOINTMENT (OUTPATIENT)
Age: 68
End: 2023-05-16

## 2023-05-16 ENCOUNTER — APPOINTMENT (OUTPATIENT)
Dept: MULTI SPECIALTY CLINIC | Facility: CLINIC | Age: 68
End: 2023-05-16
Payer: MEDICARE

## 2023-05-16 ENCOUNTER — APPOINTMENT (OUTPATIENT)
Dept: MULTI SPECIALTY CLINIC | Facility: CLINIC | Age: 68
End: 2023-05-16

## 2023-05-16 VITALS
HEART RATE: 96 BPM | TEMPERATURE: 97.2 F | BODY MASS INDEX: 23.85 KG/M2 | RESPIRATION RATE: 16 BRPM | DIASTOLIC BLOOD PRESSURE: 79 MMHG | OXYGEN SATURATION: 99 % | WEIGHT: 143.3 LBS | SYSTOLIC BLOOD PRESSURE: 190 MMHG

## 2023-05-16 DIAGNOSIS — K86.89 OTHER SPECIFIED DISEASES OF PANCREAS: ICD-10-CM

## 2023-05-16 PROCEDURE — 99204 OFFICE O/P NEW MOD 45 MIN: CPT

## 2023-05-16 NOTE — HISTORY OF PRESENT ILLNESS
[Jaundice] : jaundice [Weight loss] : weight loss [Darker Urine] : dark urine [ERCP] : ERCP [EUS] : EUS [Biopsy] : biopsy performed [Before Surgery] : before surgery [Not seen outside] : not seen outside [Nutrition] : Nutrition [Social Work] : Social Work [Fully active, able to carry on all pre-disease performance without restriction] : Status 0 - Fully active, able to carry on all pre-disease performance without restriction [de-identified] : This is a non-billable note*\par \par  \par \par  \par \par Marielena Bell is a 69 y/o female who presents for evaluation in our [facility].\par Her PMH includes; PMH: HTN, GERD, appendectomy(38yrs ago). She was on vacation in [place], when her daughter noticed yellowing of her eyes(late 2023). During work up in [place], as per patient a pancreas lesion was found on CT scan (imaging not available). [place] the [place] further [place]/u. She was found to have abnormal LFTs by her PCP and was sent to the ED on 23 at ([facility]) w/ painless jaundice (tbili: 20.5). states a 5lbs weight (x 1 month), dark urine and grey colored stools. During inpatient w/u, CT scan noted multiple soft tissue  pancreas masses in the body, neck and tail w/ vascular involvement.   \par \par CT A/P w/ IC on 23 showed an Intra- and extrahepatic biliary and pancreatic ductal dilatation secondary to a mass in the pancreatic neck. Additional soft tissue pancreatic masses noted in the body and tail suspicious for multifocal pancreatic malignancy. Recommend MRI abdomen with/without IV contrast for further characterization. \par \par s/p ERCP/EUS(Accession #     )  w/ stent placement on 23- results pending *  \par \par  CT A/P w/ IC on (POST ERCP) showed Redemonstrated multifocal pancreatic lesions, suspicious for multifocal malignancy. Please see the full description in the body of the report, including vascular involvement. Of note, a small nonocclusive filling defect within the proximal main portal vein could reflect tumor thrombus, with bland thrombus not entirely excluded. Decreased biliary duct dilation status post CBD stent placement. New mild patchy heterogeneous liver parenchymal enhancement, potentially related to post-ERCP inflammation. Cholangitis can have a similar appearance. Several subcentimeter hypodense foci within the liver, which are too small to characterize.  \par \par MRCP 5/15/23- Several pancreatic masses as above with a dominant lesion in the head/neck which deforms the SMV portal confluence. Lesion also likely involves the mid CBD which is strictured and irregular. Small nonocclusive thrombus within the main portal vein.Additional pancreatic lesions as described above. Hepatic cysts and a right hepatic lobe hemangioma. No suspicious hepatic lesion.\par \par Patient denies N/V/D. c/o dullness abd discomfort. She is very active w/ no activity limitations. She is able to climb a flight of stairs w/o any SOB or CP. She is currently staying with her Daughter in [place], who is very involved in her care. \par \par Last colonoscopy 5 yrs ago- WNL as per patient  \par \par ETOH: ex-smoker (Quit 42 yrs ago)   \par Smoking Hx: socially \par Family Ca Hx:  mother and sister w/ breast Ca  \par second  sister w/ cervical Ca and breast Ca  \par No prior genetic testing done  \par \par ECO \par \par Labs: \par 23 AST: 144   ALT: 335   ALK:  779 Tbili: 9.4  \par \par  23 Ca 19-9: 2780 CEA: 2.8  Tbili: 20. 7  \par \par 5/15/23 Ca 19-9: 651       CEA:  2.7          Tbili:  4.6\par \par AST:  80   ALT: 168    ALK: 434   \par   [TextBox_4] : 5/6/23 [TextBox_23] : 8251 [TextBox_21] : 2.8  [TextBox_41] : pending  [Other: ____] : [unfilled] [PV-SMV] : PV-SMV [Less than 180 (abutment)] : less than 180 (abutment) [head] : head [TextBox_5] : 5/16/2023  [TextBox_38] : 4377 [FreeTextEntry3] : 2.8 [FreeTextEntry4] : 20.5 [TextBox_40] : 5/6/23  [TextBox_74] : intrahepatic hypodense foci  \par mild SMV deformity  \par a small nonocclusive filling defect within the proximal main portal vein could reflect tumor thrombus, with bland thrombus. \par Liver cysts & hemangiomas (no mets)  Elevated Ca 19-9 in setting of jaundice \par Path- benign, non- diagnostic/   additional testing is pending [FreeTextEntry6] : F/U final pathology  \par If non-dx repeat EUS bx  + pancreato-scopy  \par Pending findings, consider resection (Whipple sx + intra-=op pancreatoscopy), may ultimately need total pancreatectomy  \par \par

## 2023-05-16 NOTE — CONSULT LETTER
[FreeTextEntry3] : Cruzito Hoskins MD\par Surgical Oncology\par [facility]\par Office: [phone]\par Cell: [phone]\par

## 2023-05-16 NOTE — ASSESSMENT
[FreeTextEntry1] : We discussed the review of the PMDC.  Marielena will follow up with Dr. Donohue for repeat biopsy and attempts to establish a diagnosis of a suspected malignancy.  She will follow up with us shortly thereafter.

## 2023-05-16 NOTE — HISTORY OF PRESENT ILLNESS
[Jaundice] : jaundice [Weight loss] : weight loss [Darker Urine] : dark urine [ERCP] : ERCP [EUS] : EUS [Biopsy] : biopsy performed [Before Surgery] : before surgery [Not seen outside] : not seen outside [Nutrition] : Nutrition [Social Work] : Social Work [Fully active, able to carry on all pre-disease performance without restriction] : Status 0 - Fully active, able to carry on all pre-disease performance without restriction [de-identified] : Ms. MARIELENA ORTEGA is a 68 year old woman, recently seen while inpatient, here today for an initial consultation through our [facility].\par \par Marielena's PMH/PSH is significant for HTN, GERD, open appendectomy (>30 years ago at age 30).\par FamHX of breast cancer in her mother & sister, another sister w/ cervical cancer.\par Social HX: social ETOH use, former smoker (quit 42 years ago)\par \par In late 2023 Marielena was on vacation in [place] where her family noted scleral icterus. She reports going for a CT at that time where a pancreas lesion was found along with elevated Tbili. She then returned to the [place] for further work-up.\par She presented to the ER @ [facility] in early May 2023 for painless jaundice with a Tbili of 20.5. At that time she also c/o a 5 lb weight loss over 1 month, dark urine & gray colored stools.\par She reports her last colonoscopy was ~5 years ago and WNL.\par \par CT A/P (2023) showed an intra & extrahepatic biliary and pancreatic ductal dilatation secondary to a mass in the pancreatic neck. Additional soft tissue pancreatic masses noted in the body & tail suspicious for multifocal pancreatic malignancy.\par \par EUS w/ FNB done on 2023 (w/ Dr. Alejandro Donohue & plastic CBD stent placed) showed: a complex cystic/solid lesion measuring 4.6 x 1.9 cm in the pancreatic head - invading the portal vein. PD was markedly dilated; two additional complex cystic/solid lesions were seen in the body (3.5 x 2 cm) and tail (2 x 2 cm) of the pancreas. No LAD\par Mass at pancreas head was biopsied, path PENDING*\par \par post EUS/ERCP CT A/P on 2023: re demonstrated multifocal pancreatic lesions, suspicious for multifocal malignancy, including vascular involvement. *A small nonocclusive filling defect within the proximal main portal vein could reflect tumor thrombus.\par Decreased biliary duct dilation s/p CBD stent placement, new mild patchy heterogenous liver parenchymal enhancement, potentially post-ERCP inflammation. Several subcentimeter hypodense foci within the liver -- too small to characterize.\par \par MR/MRCP 5/15/2023 - Several pancreatic masses as above with a dominant lesion in the head/neck which deforms the SMV portal confluence. Lesion also likely involves the mid CBD which is strictured and irregular. Small nonocclusive thrombus within the main portal vein. Additional pancreatic lesions as described above. Hepatic cysts and a right hepatic lobe hemangioma. No suspicious hepatic lesion \par \par ECO \par \par Labs: \par 23 AST: 144   ALT: 335   ALK:  779 Tbili: 9.4  \par 23 Ca 19-9: 2780 CEA: WNL Tbili: 20. 7  \par 5/15/23 Ca 19-9: 651     CEA: WNL           Tbili:  4.6 \par \par  2023- Marielena presents for initial PMDC visit, denies N/V/D. c/o dullness abd discomfort. She is very active w/ no activity limitations. She is able to climb a flight of stairs w/o any SOB or CP. She is currently staying with her Daughter in [place], who is very involved in her care.   [TextBox_4] : 5/6/23 [TextBox_23] : 9313 [TextBox_21] : 2.8  [TextBox_41] : pending  [TextBox_5] : 5/16/2023  [TextBox_38] : 8806 [FreeTextEntry3] : 2.8 [FreeTextEntry4] : 20.5 [TextBox_40] : 5/6/23

## 2023-05-17 ENCOUNTER — NON-APPOINTMENT (OUTPATIENT)
Age: 68
End: 2023-05-17

## 2023-05-18 ENCOUNTER — NON-APPOINTMENT (OUTPATIENT)
Age: 68
End: 2023-05-18

## 2023-05-19 ENCOUNTER — OUTPATIENT (OUTPATIENT)
Dept: OUTPATIENT SERVICES | Facility: HOSPITAL | Age: 68
LOS: 1 days | End: 2023-05-19

## 2023-05-19 VITALS
RESPIRATION RATE: 16 BRPM | HEART RATE: 78 BPM | DIASTOLIC BLOOD PRESSURE: 76 MMHG | OXYGEN SATURATION: 100 % | WEIGHT: 141.1 LBS | HEIGHT: 64.57 IN | TEMPERATURE: 98 F | SYSTOLIC BLOOD PRESSURE: 136 MMHG

## 2023-05-19 DIAGNOSIS — K86.89 OTHER SPECIFIED DISEASES OF PANCREAS: ICD-10-CM

## 2023-05-19 DIAGNOSIS — I10 ESSENTIAL (PRIMARY) HYPERTENSION: ICD-10-CM

## 2023-05-19 DIAGNOSIS — Z98.890 OTHER SPECIFIED POSTPROCEDURAL STATES: Chronic | ICD-10-CM

## 2023-05-19 LAB
INR BLD: 0.99 RATIO — SIGNIFICANT CHANGE UP (ref 0.88–1.16)
PROTHROM AB SERPL-ACNC: 11.5 SEC — SIGNIFICANT CHANGE UP (ref 10.5–13.4)

## 2023-05-19 RX ORDER — SODIUM CHLORIDE 9 MG/ML
3 INJECTION INTRAMUSCULAR; INTRAVENOUS; SUBCUTANEOUS EVERY 8 HOURS
Refills: 0 | Status: DISCONTINUED | OUTPATIENT
Start: 2023-05-24 | End: 2023-06-08

## 2023-05-19 NOTE — H&P PST ADULT - PROBLEM SELECTOR PLAN 1
Pre-op instructions provided. Pt verbalized understanding.   Pepcid provided for GI prophylaxis.  Recent labs in chart - did repeat PT/INR at [facility] (discussed with anesthesia Dr. Boyd)

## 2023-05-19 NOTE — H&P PST ADULT - HISTORY OF PRESENT ILLNESS
68 year old female with recent hospitalization for jaundice/finding of pancreatic mass s/p ERCP with stent 5/8/2023. Pt states she need another biopsy, presents today for presurgical evaluation for ... 68 year old female with recent hospitalization for jaundice/finding of pancreatic mass s/p ERCP with CBD stent 5/8/2023. Pt states she needs another biopsy, presents today for presurgical evaluation for ERCP, EUS FNB Anesthesia.

## 2023-05-19 NOTE — H&P PST ADULT - NSICDXFAMILYHX_GEN_ALL_CORE_FT
FAMILY HISTORY:  Mother  Still living? Unknown  FH: breast cancer, Age at diagnosis: Age Unknown    Sibling  Still living? Unknown  Family history of cervical cancer, Age at diagnosis: Age Unknown  FH: breast cancer, Age at diagnosis: Age Unknown

## 2023-05-23 ENCOUNTER — FORM ENCOUNTER (OUTPATIENT)
Age: 68
End: 2023-05-23

## 2023-05-24 ENCOUNTER — NON-APPOINTMENT (OUTPATIENT)
Age: 68
End: 2023-05-24

## 2023-05-24 ENCOUNTER — OUTPATIENT (OUTPATIENT)
Dept: OUTPATIENT SERVICES | Facility: HOSPITAL | Age: 68
LOS: 1 days | Discharge: HOME | End: 2023-05-24
Payer: MEDICARE

## 2023-05-24 ENCOUNTER — APPOINTMENT (OUTPATIENT)
Dept: INTERNAL MEDICINE | Facility: HOSPITAL | Age: 68
End: 2023-05-24

## 2023-05-24 ENCOUNTER — RESULT REVIEW (OUTPATIENT)
Age: 68
End: 2023-05-24

## 2023-05-24 ENCOUNTER — FORM ENCOUNTER (OUTPATIENT)
Age: 68
End: 2023-05-24

## 2023-05-24 ENCOUNTER — APPOINTMENT (OUTPATIENT)
Dept: GASTROENTEROLOGY | Facility: HOSPITAL | Age: 68
End: 2023-05-24

## 2023-05-24 VITALS
DIASTOLIC BLOOD PRESSURE: 84 MMHG | RESPIRATION RATE: 16 BRPM | SYSTOLIC BLOOD PRESSURE: 153 MMHG | OXYGEN SATURATION: 96 % | HEART RATE: 77 BPM

## 2023-05-24 VITALS
SYSTOLIC BLOOD PRESSURE: 154 MMHG | WEIGHT: 143.08 LBS | DIASTOLIC BLOOD PRESSURE: 73 MMHG | OXYGEN SATURATION: 98 % | TEMPERATURE: 98 F | RESPIRATION RATE: 19 BRPM | HEIGHT: 65 IN | HEART RATE: 80 BPM

## 2023-05-24 DIAGNOSIS — Z90.49 ACQUIRED ABSENCE OF OTHER SPECIFIED PARTS OF DIGESTIVE TRACT: Chronic | ICD-10-CM

## 2023-05-24 DIAGNOSIS — Z98.890 OTHER SPECIFIED POSTPROCEDURAL STATES: Chronic | ICD-10-CM

## 2023-05-24 DIAGNOSIS — K86.89 OTHER SPECIFIED DISEASES OF PANCREAS: ICD-10-CM

## 2023-05-24 PROCEDURE — 43239 EGD BIOPSY SINGLE/MULTIPLE: CPT | Mod: 59,GC

## 2023-05-24 PROCEDURE — 43242 EGD US FINE NEEDLE BX/ASPIR: CPT | Mod: GC

## 2023-05-24 PROCEDURE — 88172 CYTP DX EVAL FNA 1ST EA SITE: CPT | Mod: 26

## 2023-05-24 PROCEDURE — 88305 TISSUE EXAM BY PATHOLOGIST: CPT | Mod: 26

## 2023-05-24 PROCEDURE — 88173 CYTOPATH EVAL FNA REPORT: CPT | Mod: 26

## 2023-05-24 NOTE — ASU PATIENT PROFILE, ADULT - FALL HARM RISK - UNIVERSAL INTERVENTIONS
Bed in lowest position, wheels locked, appropriate side rails in place/Call bell, personal items and telephone in reach/Instruct patient to call for assistance before getting out of bed or chair/Non-slip footwear when patient is out of bed/[place] to call system/Physically safe environment - no spills, clutter or unnecessary equipment/Purposeful Proactive Rounding/Room/bathroom lighting operational, light cord in reach

## 2023-05-24 NOTE — ASU PATIENT PROFILE, ADULT - INTERNATIONAL TRAVEL DAYS 1
Symptomatic treatment:    Alternate Tylenol and Ibuprofen every 3 hrs  salt water gargles to soothe throat  Honey/lemon water to soothe throat  Cold-eeze helps to reduce the duration of URI symptoms  Oscillococcinum to redurce the duration of URI symptoms  Cepachol helps to numb the discomfort in throat  Nasal saline spray reduces inflammation and dryness  Warm face compresses/hot showers as often as you can to open sinuses   Vicks vapor rub at night  Flonase OTC or Nasacort OTC  Simple foods like chicken noodle soup help hydrate  Delsym helps with coughing at night  Zyrtec/Claritin during the day and Benadryl at night may help if allergy component   Zantac will help if there is reflux from the post nasal drip  Rest as much as you can  Your symptoms will likely last 5-7 days, maybe longer depending on how it affects your body.  You are contagious 5-7, so minimize contact with others to reduce the spread to others. Dehydration is preventable but is one of the main reasons why you will feel so badly. Drink pedialyte, gatorade or propel. Stay hydrated.  Antibiotics are not needed unless a complication(such as Otitis Media, Bacterial sinus infection or pneumonia). Taking antibiotics for Flu/Cold is not supported by evidencebased medicine and can expose you to unnecessary side effects of the medication, such as anaphylaxis.   If you experience any:  Chest pain, shortness of breath, wheezing or difficulty breathing  Severe headache, face, neck or ear pain  New rash  Fever over 101.5º F (38.6 C) for more than three days  Confusion, behavior change or seizure  Severe weakness or dizziness  Go to ER           0-6 days ([place])

## 2023-05-30 ENCOUNTER — NON-APPOINTMENT (OUTPATIENT)
Age: 68
End: 2023-05-30

## 2023-06-01 LAB — NON-GYNECOLOGICAL CYTOLOGY STUDY: SIGNIFICANT CHANGE UP

## 2023-06-02 DIAGNOSIS — R11.0 NAUSEA: ICD-10-CM

## 2023-06-02 RX ORDER — ONDANSETRON 4 MG/1
4 TABLET, ORALLY DISINTEGRATING ORAL
Qty: 90 | Refills: 6 | Status: ACTIVE | COMMUNITY
Start: 2023-06-02 | End: 1900-01-01

## 2023-06-03 ENCOUNTER — EMERGENCY (EMERGENCY)
Facility: HOSPITAL | Age: 68
LOS: 0 days | Discharge: HOME | End: 2023-06-03
Attending: EMERGENCY MEDICINE
Payer: MEDICARE

## 2023-06-03 VITALS — HEIGHT: 65 IN | WEIGHT: 139.99 LBS

## 2023-06-03 VITALS
RESPIRATION RATE: 18 BRPM | HEART RATE: 80 BPM | OXYGEN SATURATION: 100 % | DIASTOLIC BLOOD PRESSURE: 79 MMHG | TEMPERATURE: 98 F | SYSTOLIC BLOOD PRESSURE: 148 MMHG

## 2023-06-03 DIAGNOSIS — Z85.07 PERSONAL HISTORY OF MALIGNANT NEOPLASM OF PANCREAS: ICD-10-CM

## 2023-06-03 DIAGNOSIS — Z98.890 OTHER SPECIFIED POSTPROCEDURAL STATES: Chronic | ICD-10-CM

## 2023-06-03 DIAGNOSIS — M26.51 ABNORMAL JAW CLOSURE: ICD-10-CM

## 2023-06-03 DIAGNOSIS — Z90.49 ACQUIRED ABSENCE OF OTHER SPECIFIED PARTS OF DIGESTIVE TRACT: Chronic | ICD-10-CM

## 2023-06-03 DIAGNOSIS — Z90.49 ACQUIRED ABSENCE OF OTHER SPECIFIED PARTS OF DIGESTIVE TRACT: ICD-10-CM

## 2023-06-03 DIAGNOSIS — R68.84 JAW PAIN: ICD-10-CM

## 2023-06-03 DIAGNOSIS — I10 ESSENTIAL (PRIMARY) HYPERTENSION: ICD-10-CM

## 2023-06-03 PROBLEM — K86.89 OTHER SPECIFIED DISEASES OF PANCREAS: Chronic | Status: ACTIVE | Noted: 2023-05-19

## 2023-06-03 PROCEDURE — 99282 EMERGENCY DEPT VISIT SF MDM: CPT

## 2023-06-03 PROCEDURE — 99283 EMERGENCY DEPT VISIT LOW MDM: CPT | Mod: FS

## 2023-06-03 NOTE — ED ADULT TRIAGE NOTE - NS ED TRIAGE AVPU SCALE
Placed PIV; collected labs.   Alert-The patient is alert, awake and responds to voice. The patient is oriented to time, place, and person. The triage nurse is able to obtain subjective information.

## 2023-06-03 NOTE — ED STATDOCS - CLINICAL SUMMARY MEDICAL DECISION MAKING FREE TEXT BOX
67 yo F with jaw malaligned, able to chew and speak, states its been since endoscopy on 5/24. Offered pt imaging, but pt declines. will refer for close outpt with [facility], will call Monday with appointment with pt.

## 2023-06-03 NOTE — ED ADULT TRIAGE NOTE - CHIEF COMPLAINT QUOTE
pt ambulatory into ED chief concern of locked jaw since endoscopy on 05/24/23. pt is able to open mouth without pain or discomfort but teeth are misaligned upon clenching jaw which she states is abnormal.

## 2023-06-03 NOTE — ED STATDOCS - OBJECTIVE STATEMENT
67 yo F with PMHx of HTN and pancreatic CA, presents to ED c/o misaligned jaw since endoscopy on 05/24/23. Pt is able to open mouth without discomfort. States she can still chew and denies pain, but teeth are misaligned upon clenching jaw which is abnormal. Was unable to get help from ___ so came to ED for further evaluation.  pt's cell: [phone]

## 2023-06-03 NOTE — ED STATDOCS - NS ED ATTENDING STATEMENT MOD
Attending Only This was a shared visit with the TIA. I reviewed and verified the documentation and independently performed the documented:

## 2023-06-03 NOTE — ED STATDOCS - NSFOLLOWUPINSTRUCTIONS_ED_ALL_ED_FT
Jaw Range of Motion Exercises  Bag of ice on a towel on the skin.   Jaw range of motion exercises are exercises that help your jaw move better. Exercises that help you have good posture (postural exercises) also help relieve jaw discomfort. These are often done along with range of motion exercises. These exercises can help prevent or improve:  Trouble opening your mouth.  Pain in your jaw while it is open or closed.  Temporomandibular joint (TMJ) pain.  Headache caused by jaw tension.  Take other actions to prevent or relieve jaw pain, such as:  Avoiding things that cause or increase jaw pain. This may include:  Chewing gum or eating hard foods.  Clenching your jaw or teeth, grinding your teeth, or keeping tension in your jaw muscles.  Opening your mouth wide, such as for a big yawn.  Leaning on your jaw, such as resting your jaw in your hand while leaning on a desk.  Putting ice on your jaw. To do this:  Put ice in a plastic bag.  Place a towel between your skin and the bag.  Leave the ice on for 20 minutes, 2–3 times a day.  Remove the ice if your skin turns bright red. This is very important. If you cannot feel pain, heat, or cold, you have a greater risk of damage to the area.  Only do jaw exercises that your health care provider recommends. Only move your jaw as far as it can comfortably go in each direction. Do not move your jaw into positions that cause pain.    Range of motion exercises  Repeat each of these exercises 8 times, 1–2 times a day, or as told by your health care provider.    Exercise A: Forward protrusion    A person doing a forward protrusion exercise, with the jaw pushed forward.   Push your jaw forward. Hold this position for 1–2 seconds.  Let your jaw return to its normal position and rest it there for 1–2 seconds.  Exercise B: Controlled opening    A person doing controlled opening of the mouth. The mouth is opened, while the tongue is placed firmly on the roof of the mouth.   Stand or sit in front of a mirror. Place your tongue on the roof of your mouth, just behind your top teeth.  Keeping your tongue on the roof of your mouth, slowly open and close your mouth.  While you open and close your mouth, watch your jaw in the mirror. Try to keep your jaw from moving to one side or the other.  Exercise C: Right and left motion    A person doing the right and left motion exercise by moving his jaw to the right and then moving it again to the left.   Move your jaw right. Hold this position for 1–2 seconds. Let your jaw return to its normal position, and rest it there for 1–2 seconds.  Move your jaw left. Hold this position for 1–2 seconds. Let your jaw return to its normal position, and rest it there for 1–2 seconds.  Postural exercises  Exercise A: Chin tucks    A person doing chin tucks. She's sitting on a chair. She pushes her head straight back while keeping it level.   You can do this exercise sitting, standing, or lying down.  Move your head straight back, keeping your head level. You can guide the movement by placing your fingers on your chin to push your jaw back in an even motion. You should be able to feel a double chin form at the end of the motion.  Hold this position for 5 seconds. Repeat 10–15 times.  Exercise B: Shoulder blade squeeze    Person standing with arms at sides. Person pulls both shoulders backward and downward until a stretch is felt.  Sit or stand.  Bend your elbows to about 90 degrees, which is the shape of a capital letter "L." Keep your upper arms by your body.  Squeeze your shoulder blades down and back, as though you were trying to touch your elbows behind you. Do not shrug your shoulders or move your head.  Hold this position for 5 seconds. Repeat 10–15 times.  Exercise C: Chest stretch    A person doing the chest stretch by standing in a doorway, arms raised and placed on the door frame, and body weight placed on the front leg.    a doorway with one of your feet slightly in front of the other. This is called a staggered stance. If you cannot reach your forearms to the door frame, do this exercise in a corner of a room.  Put both of your hands and your forearms on the door frame, with your arms wide apart.  Make sure your arms are at a 90-degree angle to your body. Place your hands on the door frame at the height of your elbows.  Slowly move your weight onto your front foot until you feel a stretch across your chest and in the front of your shoulders. Keep your head and chest upright and keep your abdominal muscles tight. Do not lean in.  Hold this position for 30 seconds. Repeat 3 times.  Contact a health care provider if:  You have jaw pain that is new or gets worse.  You have clicking or popping sounds while doing the exercises.  Get help right away if:  Your jaw is stuck in one place and you cannot move it.  You cannot open or close your mouth.  Summary  Jaw range of motion exercises are exercises that help your jaw move better.  Take actions to prevent or relieve jaw pain: limit chewing gum or eating hard foods; clenching your jaw or teeth; or leaning on your jaw, such as resting your jaw in your hand while leaning on a desk.  Repeat each of the jaw range of motion exercises 8 times, 1–2 times a day, or as told by your health care provider.  Contact a health care provider if you have clicking or popping sounds while doing the exercises.  This information is not intended to replace advice given to you by your health care provider. Make sure you discuss any questions you have with your health care provider.

## 2023-06-03 NOTE — ED STATDOCS - ATTENDING APP SHARED VISIT CONTRIBUTION OF CARE
I, Sabina Campos MD, performed the initial face to face bedside interview with this patient regarding history of present illness, review of symptoms and relevant past medical, social and family history.  I completed an independent physical examination.  I was the initial provider who evaluated this patient. I have signed out the follow up of any pending tests (i.e. labs, radiological studies) to the ACP.  I have communicated the patient’s plan of care and disposition with the ACP.  The history, relevant review of systems, past medical and surgical history, medical decision making, and physical examination was documented by the scribe in my presence and I attest to the accuracy of the documentation.

## 2023-06-03 NOTE — ED STATDOCS - PATIENT PORTAL LINK FT
You can access the FollowMyHealth Patient Portal offered by [facility] by registering at the following website: [email or web address]. By joining [facility]’s FollowMyHealth portal, you will also be able to view your health information using other applications (apps) compatible with our system.

## 2023-06-03 NOTE — ED STATDOCS - NS ED ROS FT
Constitutional: No fever or chills  Eyes: No visual changes  HEENT: + jaw discomfort and unaligned  CV: No chest pain  Resp: No SOB no cough  GI: No abd pain, nausea or vomiting  : No dysuria  MSK: No musculoskeletal pain  Skin: No rash  Neuro: No headache

## 2023-06-03 NOTE — ED STATDOCS - PHYSICAL EXAMINATION
Constitutional: middle aged female sitting in chair in NAD   Eyes: PERRLA  Head: Normocephalic   Mouth: pt able to open mouth jaw malaligned, able to chew, no pain over TMJ, no erythema, no warmth, teeth do not fit together as used to  Cardiac: regular rate   Resp: Lungs CTAB  GI: Abd s/nt/nd, no rebound or guarding.  Neuro: awake, alert, moving all extremities  Skin: No rashes

## 2023-06-06 ENCOUNTER — NON-APPOINTMENT (OUTPATIENT)
Age: 68
End: 2023-06-06

## 2023-06-06 ENCOUNTER — APPOINTMENT (OUTPATIENT)
Dept: SURGICAL ONCOLOGY | Facility: CLINIC | Age: 68
End: 2023-06-06
Payer: MEDICARE

## 2023-06-06 VITALS
SYSTOLIC BLOOD PRESSURE: 166 MMHG | RESPIRATION RATE: 17 BRPM | HEART RATE: 91 BPM | BODY MASS INDEX: 23.32 KG/M2 | OXYGEN SATURATION: 98 % | DIASTOLIC BLOOD PRESSURE: 81 MMHG | HEIGHT: 65 IN | WEIGHT: 140 LBS

## 2023-06-06 PROCEDURE — 99214 OFFICE O/P EST MOD 30 MIN: CPT

## 2023-06-06 RX ORDER — MEGESTROL ACETATE 40 MG/ML
400 SUSPENSION ORAL TWICE DAILY
Qty: 600 | Refills: 0 | Status: ACTIVE | COMMUNITY
Start: 2023-06-06 | End: 1900-01-01

## 2023-06-06 NOTE — ASSESSMENT
[FreeTextEntry1] : We discussed at length multimodal management of pancreatic adenocarcinoma.  Given the imaging findings and biopsy results she appears to have multifocal pancreatic cancer.  We discussed the role of neoadjuvant treatment. Marielena will be following up with Dr. Jones in the near future.  She is slated to follow-up with Dr. Gil tomorrow regarding her mandibular subluxation.\par \par She will also undergo a PET CT as well.

## 2023-06-06 NOTE — HISTORY OF PRESENT ILLNESS
[Endostent] : endostent [de-identified] : Ms. LIANNA ORTEGA is a 68 year old woman here for a follow-up visit.\par \par Initially seen while inpatient and then presented through our [facility] on 2023. \par \par Lianna's PMH/PSH is significant for HTN, GERD, open appendectomy (>30 years ago at age 30).\par FamHX of breast cancer in her mother & sister, another sister w/ cervical cancer.\par Social HX: social ETOH use, former smoker (quit 42 years ago)\par \par In late 2023 Lianna was on vacation in [place] where her family noted scleral icterus. She reports going for a CT at that time where a pancreas lesion was found along with elevated Tbili. She then returned to the [place] for further work-up.\par She presented to the ER @ [facility] in early May 2023 for painless jaundice with a Tbili of 20.5. At that time she also c/o a 5 lb weight loss over 1 month, dark urine & gray colored stools.\par She reports her last colonoscopy was ~5 years ago and WNL.\par \par CT A/P (2023) showed an intra & extrahepatic biliary and pancreatic ductal dilatation secondary to a mass in the pancreatic neck. Additional soft tissue pancreatic masses noted in the body & tail suspicious for multifocal pancreatic malignancy.\par \par EUS w/ FNB done on 2023 (w/ Dr. Alejandro Donohue & plastic CBD stent placed) showed: a complex cystic/solid lesion measuring 4.6 x 1.9 cm in the pancreatic head - invading the portal vein. PD was markedly dilated; two additional complex cystic/solid lesions were seen in the body (3.5 x 2 cm) and tail (2 x 2 cm) of the pancreas. No LAD\par Mass at pancreas head was biopsied, path PENDING*\par \par post EUS/ERCP CT A/P on 2023: re demonstrated multifocal pancreatic lesions, suspicious for multifocal malignancy, including vascular involvement. *A small nonocclusive filling defect within the proximal main portal vein could reflect tumor thrombus.\par Decreased biliary duct dilation s/p CBD stent placement, new mild patchy heterogenous liver parenchymal enhancement, potentially post-ERCP inflammation. Several subcentimeter hypodense foci within the liver -- too small to characterize.\par \par MR/MRCP 5/15/2023 - Several pancreatic masses as above with a dominant lesion in the head/neck which deforms the SMV portal confluence. Lesion also likely involves the mid CBD which is strictured and irregular. Small nonocclusive thrombus within the main portal vein. Additional pancreatic lesions as described above. Hepatic cysts and a right hepatic lobe hemangioma. No suspicious hepatic lesion \par \par ECO \par \par Labs: \par 23 AST: 144   ALT: 335   ALK:  779 Tbili: 9.4  \par 23 Ca 19-9: 2780 CEA: WNL Tbili: 20. 7  \par 5/15/23 Ca 19-9: 651     CEA: WNL           Tbili:  4.6 \par \par 2023- Lianna presents for initial PMDC visit, denies N/V/D. c/o dullness abd discomfort. She is very active w/ no activity limitations. She is able to climb a flight of stairs w/o any SOB or CP. She is currently staying with her Daughter in [place], who is very involved in her care. We discussed the review of the PMDC. Lianna will follow up with Dr. Donohue for repeat biopsy and attempts to establish a diagnosis of a suspected malignancy. She will follow up with us shortly thereafter. \par \par She is s/p upper EUS with Dr. Alejandro Donohue on 23 with the following findings:\par 1) Pancreatic head mass (2.9 cm, upstream PD dilation, s/p FNA: atypical findings)\par 2) Common bile duct mass (1.3 cm, s/p FNA: +ADENOCARCINOMA)\par 3) Pancreatic body mass (2.5 cm, upstream PD dilation, s/p FNA: suspicious for malignancy\par 4) Main pancreatic duct mass (no cyto reported)\par \par 2023 Lianna returns today to discuss the pathology results.  She is accompanied by her sister, Keyla.  She denies any vomiting.  She does experience intermittent nausea, which improves with Zofran.  She has gone to the emergency department at [facility] for dislocation of her mandible. [TextBox_4] : 5/6/23 [TextBox_23] : 8887 [TextBox_21] : 2.8  [TextBox_41] : Adenocarcinoma [TextBox_43] : 6/1/2023 [TextBox_5] : 5/16/2023  [TextBox_38] : 3069 [FreeTextEntry3] : 2.8 [FreeTextEntry4] : 20.5 [TextBox_40] : 5/6/23

## 2023-06-06 NOTE — CONSULT LETTER
[Dear  ___] : Dear  [unfilled], [Consult Letter:] : I had the pleasure of evaluating your patient, [unfilled]. [Please see my note below.] : Please see my note below. [Consult Closing:] : Thank you very much for allowing me to participate in the care of this patient.  If you have any questions, please do not hesitate to contact me. [Sincerely,] : Sincerely, [FreeTextEntry2] : Juan Hogan, DO [FreeTextEntry3] : Cruzito Hoskins MD\par Surgical Oncology\par [facility]\par Office: [phone]\par Cell: [phone]\par  [DrShlomo  ___] : Dr. ARNOLD [DrShlomo ___] : Dr. ARNOLD

## 2023-06-07 ENCOUNTER — OUTPATIENT (OUTPATIENT)
Dept: OUTPATIENT SERVICES | Facility: HOSPITAL | Age: 68
LOS: 1 days | Discharge: HOME | End: 2023-06-07

## 2023-06-07 DIAGNOSIS — Z98.890 OTHER SPECIFIED POSTPROCEDURAL STATES: Chronic | ICD-10-CM

## 2023-06-07 DIAGNOSIS — Z90.49 ACQUIRED ABSENCE OF OTHER SPECIFIED PARTS OF DIGESTIVE TRACT: Chronic | ICD-10-CM

## 2023-06-07 DIAGNOSIS — C25.9 MALIGNANT NEOPLASM OF PANCREAS, UNSPECIFIED: ICD-10-CM

## 2023-06-09 ENCOUNTER — APPOINTMENT (OUTPATIENT)
Dept: NUCLEAR MEDICINE | Facility: IMAGING CENTER | Age: 68
End: 2023-06-09
Payer: MEDICARE

## 2023-06-09 ENCOUNTER — APPOINTMENT (OUTPATIENT)
Dept: INTERVENTIONAL RADIOLOGY/VASCULAR | Facility: CLINIC | Age: 68
End: 2023-06-09
Payer: MEDICARE

## 2023-06-09 ENCOUNTER — OUTPATIENT (OUTPATIENT)
Dept: OUTPATIENT SERVICES | Facility: HOSPITAL | Age: 68
LOS: 1 days | End: 2023-06-09
Payer: MEDICARE

## 2023-06-09 VITALS — BODY MASS INDEX: 23.32 KG/M2 | WEIGHT: 140 LBS | HEIGHT: 65 IN

## 2023-06-09 DIAGNOSIS — Z98.890 OTHER SPECIFIED POSTPROCEDURAL STATES: Chronic | ICD-10-CM

## 2023-06-09 DIAGNOSIS — K86.89 OTHER SPECIFIED DISEASES OF PANCREAS: ICD-10-CM

## 2023-06-09 DIAGNOSIS — Z90.49 ACQUIRED ABSENCE OF OTHER SPECIFIED PARTS OF DIGESTIVE TRACT: Chronic | ICD-10-CM

## 2023-06-09 PROCEDURE — 78815 PET IMAGE W/CT SKULL-THIGH: CPT

## 2023-06-09 PROCEDURE — 99203 OFFICE O/P NEW LOW 30 MIN: CPT

## 2023-06-09 PROCEDURE — 78815 PET IMAGE W/CT SKULL-THIGH: CPT | Mod: 26,PI,MH

## 2023-06-09 PROCEDURE — A9552: CPT

## 2023-06-09 NOTE — REASON FOR VISIT
[Consultation] : a consultation visit [Family Member] : family member [FreeTextEntry1] : Port placement

## 2023-06-09 NOTE — REVIEW OF SYSTEMS
[Cough] : cough [Abdominal Pain] : abdominal pain [Fever] : no fever [Chills] : no chills [Feeling Tired] : not feeling tired [Nosebleeds] : no nosebleeds [Sore Throat] : no sore throat [Hoarseness] : no hoarseness [Chest Pain] : no chest pain [Palpitations] : no palpitations [Shortness Of Breath] : no shortness of breath [Wheezing] : no wheezing [Vomiting] : no vomiting [Constipation] : no constipation [Diarrhea] : no diarrhea [Easy Bleeding] : no tendency for easy bleeding [Easy Bruising] : no tendency for easy bruising

## 2023-06-09 NOTE — ASSESSMENT
[FreeTextEntry1] : 68-year-old female with pancreatic cancer requiring long-term chemotherapy access and referred for port placement.\par \par All questions asked and answered to completion and patient's satisfaction.\par \par Risks, benefits, and alternatives to the procedure were discussed with the patient and informed consent was obtained.\par \par Plan:\par 1.  Internal jugular subcutaneous chest wall port insertion.\par 2.  Sedation by anesthesiology. [Other: _____] : [unfilled]

## 2023-06-09 NOTE — HISTORY OF PRESENT ILLNESS
[Abdomen] : abdomen [Intermit.] : ~His/Her~  symptoms seem to be intermittent [Aching] : aching [Stable] : stable [FreeTextEntry1] : Patient is a 68 year old female with a past medical history of HTN, GERD, open appendectomy (>30 years ago at age 30) and pancreatic mass. Pancreatic lesion was found in April 2023 and presented to [facility] May 2023 with painless jaundice and weight loss. 5/24/23 Upper EUS found 1) Pancreatic head mass (2.9 cm, upstream PD dilation, s/p FNA: atypical findings) 2) Common bile duct mass (1.3 cm, s/p FNA: +ADENOCARCINOMA)\par 3) Pancreatic body mass (2.5 cm, upstream PD dilation, s/p FNA: suspicious for malignancy 4) Main pancreatic duct mass (no cyto reported). She has now been referred to IR by Dr. Hoskins for consultation regarding mediport placement. Patient does not have a start date as of yet. \par \par Patient denies recent fever, chills, shortness of breath, chest pain, vomiting or diarrhea \par \par \par

## 2023-06-09 NOTE — PHYSICAL EXAM
[Alert] : alert [No Respiratory Distress] : no respiratory distress [No Accessory Muscle Use] : no accessory muscle use [Oriented x3] : oriented to person, place, and time

## 2023-06-12 ENCOUNTER — APPOINTMENT (OUTPATIENT)
Dept: SURGICAL ONCOLOGY | Facility: AMBULATORY SURGERY CENTER | Age: 68
End: 2023-06-12
Payer: MEDICARE

## 2023-06-13 ENCOUNTER — RESULT REVIEW (OUTPATIENT)
Age: 68
End: 2023-06-13

## 2023-06-13 ENCOUNTER — APPOINTMENT (OUTPATIENT)
Dept: HEMATOLOGY ONCOLOGY | Facility: CLINIC | Age: 68
End: 2023-06-13
Payer: MEDICARE

## 2023-06-13 ENCOUNTER — OUTPATIENT (OUTPATIENT)
Dept: OUTPATIENT SERVICES | Facility: HOSPITAL | Age: 68
LOS: 1 days | End: 2023-06-13
Payer: MEDICARE

## 2023-06-13 VITALS
OXYGEN SATURATION: 100 % | WEIGHT: 136.68 LBS | RESPIRATION RATE: 16 BRPM | BODY MASS INDEX: 22.77 KG/M2 | HEART RATE: 85 BPM | HEIGHT: 65 IN | TEMPERATURE: 96.8 F | SYSTOLIC BLOOD PRESSURE: 149 MMHG | DIASTOLIC BLOOD PRESSURE: 87 MMHG

## 2023-06-13 DIAGNOSIS — Z90.49 ACQUIRED ABSENCE OF OTHER SPECIFIED PARTS OF DIGESTIVE TRACT: Chronic | ICD-10-CM

## 2023-06-13 DIAGNOSIS — Z98.890 OTHER SPECIFIED POSTPROCEDURAL STATES: Chronic | ICD-10-CM

## 2023-06-13 LAB
ALBUMIN SERPL ELPH-MCNC: 4.3 G/DL
ALP BLD-CCNC: 103 U/L
ALT SERPL-CCNC: 18 U/L
ANION GAP SERPL CALC-SCNC: 14 MMOL/L
AST SERPL-CCNC: 18 U/L
BASOPHILS # BLD AUTO: 0.05 K/UL — SIGNIFICANT CHANGE UP (ref 0–0.2)
BASOPHILS NFR BLD AUTO: 0.6 % — SIGNIFICANT CHANGE UP (ref 0–2)
BILIRUB SERPL-MCNC: 1 MG/DL
BUN SERPL-MCNC: 10 MG/DL
CALCIUM SERPL-MCNC: 9.9 MG/DL
CANCER AG19-9 SERPL-ACNC: 223 U/ML
CHLORIDE SERPL-SCNC: 105 MMOL/L
CO2 SERPL-SCNC: 24 MMOL/L
CREAT SERPL-MCNC: 0.6 MG/DL
EGFR: 98 ML/MIN/1.73M2
EOSINOPHIL # BLD AUTO: 0.09 K/UL — SIGNIFICANT CHANGE UP (ref 0–0.5)
EOSINOPHIL NFR BLD AUTO: 1 % — SIGNIFICANT CHANGE UP (ref 0–6)
GLUCOSE SERPL-MCNC: 134 MG/DL
HCT VFR BLD CALC: 41.2 % — SIGNIFICANT CHANGE UP (ref 34.5–45)
HGB BLD-MCNC: 13.4 G/DL — SIGNIFICANT CHANGE UP (ref 11.5–15.5)
IMM GRANULOCYTES NFR BLD AUTO: 0.3 % — SIGNIFICANT CHANGE UP (ref 0–0.9)
INR PPP: 1.02 RATIO
LYMPHOCYTES # BLD AUTO: 2.02 K/UL — SIGNIFICANT CHANGE UP (ref 1–3.3)
LYMPHOCYTES # BLD AUTO: 23.4 % — SIGNIFICANT CHANGE UP (ref 13–44)
MCHC RBC-ENTMCNC: 32.5 G/DL — SIGNIFICANT CHANGE UP (ref 32–36)
MCHC RBC-ENTMCNC: 33.1 PG — SIGNIFICANT CHANGE UP (ref 27–34)
MCV RBC AUTO: 101.7 FL — HIGH (ref 80–100)
MONOCYTES # BLD AUTO: 0.75 K/UL — SIGNIFICANT CHANGE UP (ref 0–0.9)
MONOCYTES NFR BLD AUTO: 8.7 % — SIGNIFICANT CHANGE UP (ref 2–14)
NEUTROPHILS # BLD AUTO: 5.71 K/UL — SIGNIFICANT CHANGE UP (ref 1.8–7.4)
NEUTROPHILS NFR BLD AUTO: 66 % — SIGNIFICANT CHANGE UP (ref 43–77)
NRBC # BLD: 0 /100 WBCS — SIGNIFICANT CHANGE UP (ref 0–0)
PLATELET # BLD AUTO: 286 K/UL — SIGNIFICANT CHANGE UP (ref 150–400)
POTASSIUM SERPL-SCNC: 4.3 MMOL/L
PROT SERPL-MCNC: 6.9 G/DL
PT BLD: 12 SEC
RBC # BLD: 4.05 M/UL — SIGNIFICANT CHANGE UP (ref 3.8–5.2)
RBC # FLD: 12.8 % — SIGNIFICANT CHANGE UP (ref 10.3–14.5)
SODIUM SERPL-SCNC: 142 MMOL/L
WBC # BLD: 8.65 K/UL — SIGNIFICANT CHANGE UP (ref 3.8–10.5)
WBC # FLD AUTO: 8.65 K/UL — SIGNIFICANT CHANGE UP (ref 3.8–10.5)

## 2023-06-13 PROCEDURE — 76937 US GUIDE VASCULAR ACCESS: CPT | Mod: 26

## 2023-06-13 PROCEDURE — 77001 FLUOROGUIDE FOR VEIN DEVICE: CPT | Mod: 26,GC

## 2023-06-13 PROCEDURE — 99205 OFFICE O/P NEW HI 60 MIN: CPT

## 2023-06-13 PROCEDURE — 36561 INSERT TUNNELED CV CATH: CPT

## 2023-06-13 RX ORDER — CHLORHEXIDINE GLUCONATE 213 G/1000ML
1 SOLUTION TOPICAL ONCE
Refills: 0 | Status: DISCONTINUED | OUTPATIENT
Start: 2023-06-13 | End: 2023-06-27

## 2023-06-13 RX ORDER — SODIUM CHLORIDE 9 MG/ML
10 INJECTION INTRAMUSCULAR; INTRAVENOUS; SUBCUTANEOUS ONCE
Refills: 0 | Status: DISCONTINUED | OUTPATIENT
Start: 2023-06-13 | End: 2023-06-27

## 2023-06-13 NOTE — HISTORY OF PRESENT ILLNESS
[Jaundice] : jaundice [Weight loss] : weight loss [Darker Urine] : dark urine [ERCP] : ERCP [EUS] : EUS [Biopsy] : biopsy performed [TextBox_4] : 5/6/23 [TextBox_23] : 8749 [TextBox_21] : 2.8  [TextBox_41] : pending  [Before Surgery] : before surgery [Not seen outside] : not seen outside [Other: ____] : [unfilled] [PV-SMV] : PV-SMV [Less than 180 (abutment)] : less than 180 (abutment) [head] : head [Nutrition] : Nutrition [Social Work] : Social Work [Fully active, able to carry on all pre-disease performance without restriction] : Status 0 - Fully active, able to carry on all pre-disease performance without restriction [TextBox_5] : 5/16/2023  [TextBox_38] : 8327 [FreeTextEntry3] : 2.8 [FreeTextEntry4] : 20.5 [TextBox_40] : 5/6/23  [TextBox_74] : intrahepatic hypodense foci  \par mild SMV deformity  \par a small nonocclusive filling defect within the proximal main portal vein could reflect tumor thrombus, with bland thrombus. \par Liver cysts & hemangiomas (no mets)  Elevated Ca 19-9 in setting of jaundice \par Path- benign, non- diagnostic/   additional testing is pending [FreeTextEntry6] : F/U final pathology  \par If non-dx repeat EUS bx  + pancreato-scopy  \par Pending findings, consider resection (Whipple sx + intra-=op pancreatoscopy), may ultimately need total pancreatectomy  \par \par   [de-identified] : ID: 67 y/o woman with new diagnosis of pancreatic cancer, staging to be confirmed, presenting to medical oncology.\par \par Chronological Hx of Cancer (including scans, in two column format):\par Apl  on vacation in [place], when her daughter noticed yellowing of her eyes\par 23 CT A/P showed masses in pancreas (body, tail, multifocal), and obstructive jaundice biliary ductal dilitation\par 23 s/p ERCP/EUS\par 23 CT A/P showed multi-focal pancreatic disease and vascular involvement\par 05/15/23 MRCP showed pancreatic masses, dominant lesion in the head/neck w SMV deformity, no liver lesion\par \par MMR status (all GI cancers):\par Tumor Mutation Data (if advanced):\par Germline Data (if pancreas or young):\par Signatera/ctDNA testing: \par HER2 (if eso/gastric):\par PD-L1 (if eso/gastric):\par \par  \par ETOH: ex-smoker (Quit 42 yrs ago)   \par Smoking Hx: socially\par Family Ca Hx:  mother and sister w/ breast Ca  ; second  sister w/ cervical Ca and breast Ca  ; No prior genetic testing done  \par \par ECO \par \par Labs: \par 23 AST: 144   ALT: 335   ALK:  779 Tbili: 9.4  \par \par  23 Ca 19-9: 2780 CEA: 2.8  Tbili: 20. 7  \par \par 5/15/23 Ca 19-9: 651       CEA:  2.7          Tbili:  4.6\par \par AST:  80   ALT: 168    ALK: 434   \par \par \par \par \par PMHx (those that will influence cancer management plan): Her PMH includes; PMH: HTN, GERD, appendectomy(38yrs ago). \par Social Hx (including place of residency, job, hobby): lives with Daughter in [place], was previously living in [place]. Retired, wkred for the [employer]\par Fam Hx (aimee GI cancers):\par \par Additional Quality Metrics:\par Goals of Care discussion:\par Enyzmes considered:\par Palliative care referral considered:\par   [de-identified] : 6/13\par -overall she feels decently well\par -losing a bit of weight\par -still has good activity level\par

## 2023-06-13 NOTE — ASSESSMENT
[FreeTextEntry1] : 69 y/o woman with pancreatic adenocarcinoma, multifocal disease, initially some concern for possible liver metastatic disase but on re-review of PET scan more consistent with localized disease. She is active, her goal is to be aggressive to aim for curative, definitive approach. Her disease is multifocal in nature and neoadjuvant approach reasonable given that her disease would require a total pancreatectomy  we want to make sure disease biology is such that disease is truly confined to pancreas. Will aim for FOLFIRINOX given her excellent performance status. Given that she lives in [place], treatment at [place] would be more convenient to her but at this point will start her off on chemotherapy as next available appointment there is in July. Port placement today. Will also send off [facility] testing\par \par Plan:\par - port placement\par - FOLFIRINOX , 5FU 2400 oxali 85, irinotecan 150\par - treatment ASAP likely next week \par - will plan to re-scan after 2 months\par - [facility] testing today 6/13\par - [employer]a testing prior to treatment\par - PMDC re-review at 2 and 4 months\par \par Rod Jones MD PhD\par Medical Oncology Attending\par I personally have spent a total of 60 minutes of time on the date of this encounter reviewing test results, documenting findings, coordinating care, and directly consulting with the patient and/or designated family member.\par \par

## 2023-06-14 ENCOUNTER — NON-APPOINTMENT (OUTPATIENT)
Age: 68
End: 2023-06-14

## 2023-06-14 LAB
HAV IGM SER QL: NONREACTIVE
HBV CORE IGM SER QL: NONREACTIVE
HBV SURFACE AG SER QL: NONREACTIVE
HCV AB SER QL: NONREACTIVE
HCV S/CO RATIO: 0.12 S/CO

## 2023-06-19 DIAGNOSIS — C25.9 MALIGNANT NEOPLASM OF PANCREAS, UNSPECIFIED: ICD-10-CM

## 2023-06-19 DIAGNOSIS — Z45.2 ENCOUNTER FOR ADJUSTMENT AND MANAGEMENT OF VASCULAR ACCESS DEVICE: ICD-10-CM

## 2023-06-20 RX ORDER — PROCHLORPERAZINE MALEATE 10 MG/1
10 TABLET ORAL
Qty: 180 | Refills: 0 | Status: ACTIVE | COMMUNITY
Start: 2023-06-15 | End: 1900-01-01

## 2023-06-20 RX ORDER — LIDOCAINE AND PRILOCAINE 25; 25 MG/G; MG/G
2.5-2.5 CREAM TOPICAL
Qty: 1 | Refills: 2 | Status: ACTIVE | COMMUNITY
Start: 2023-06-15 | End: 1900-01-01

## 2023-06-22 ENCOUNTER — RESULT REVIEW (OUTPATIENT)
Age: 68
End: 2023-06-22

## 2023-06-22 ENCOUNTER — APPOINTMENT (OUTPATIENT)
Dept: HEMATOLOGY ONCOLOGY | Facility: CLINIC | Age: 68
End: 2023-06-22

## 2023-06-22 ENCOUNTER — NON-APPOINTMENT (OUTPATIENT)
Age: 68
End: 2023-06-22

## 2023-06-22 ENCOUNTER — APPOINTMENT (OUTPATIENT)
Dept: INFUSION THERAPY | Facility: HOSPITAL | Age: 68
End: 2023-06-22

## 2023-06-22 ENCOUNTER — APPOINTMENT (OUTPATIENT)
Dept: HEMATOLOGY ONCOLOGY | Facility: CLINIC | Age: 68
End: 2023-06-22
Payer: MEDICARE

## 2023-06-22 DIAGNOSIS — Z71.89 OTHER SPECIFIED COUNSELING: ICD-10-CM

## 2023-06-22 DIAGNOSIS — R11.2 NAUSEA WITH VOMITING, UNSPECIFIED: ICD-10-CM

## 2023-06-22 DIAGNOSIS — Z51.11 ENCOUNTER FOR ANTINEOPLASTIC CHEMOTHERAPY: ICD-10-CM

## 2023-06-22 DIAGNOSIS — I10 ESSENTIAL (PRIMARY) HYPERTENSION: ICD-10-CM

## 2023-06-22 LAB
ALBUMIN SERPL ELPH-MCNC: 3.7 G/DL — SIGNIFICANT CHANGE UP (ref 3.3–5)
ALP SERPL-CCNC: 167 U/L — HIGH (ref 40–120)
ALT FLD-CCNC: 37 U/L — SIGNIFICANT CHANGE UP (ref 10–45)
ANION GAP SERPL CALC-SCNC: 13 MMOL/L — SIGNIFICANT CHANGE UP (ref 5–17)
AST SERPL-CCNC: 21 U/L — SIGNIFICANT CHANGE UP (ref 10–40)
BASOPHILS # BLD AUTO: 0.06 K/UL — SIGNIFICANT CHANGE UP (ref 0–0.2)
BASOPHILS NFR BLD AUTO: 0.9 % — SIGNIFICANT CHANGE UP (ref 0–2)
BILIRUB SERPL-MCNC: 0.6 MG/DL — SIGNIFICANT CHANGE UP (ref 0.2–1.2)
BUN SERPL-MCNC: 15 MG/DL — SIGNIFICANT CHANGE UP (ref 7–23)
CALCIUM SERPL-MCNC: 9.3 MG/DL — SIGNIFICANT CHANGE UP (ref 8.4–10.5)
CHLORIDE SERPL-SCNC: 103 MMOL/L — SIGNIFICANT CHANGE UP (ref 96–108)
CO2 SERPL-SCNC: 22 MMOL/L — SIGNIFICANT CHANGE UP (ref 22–31)
CREAT SERPL-MCNC: 0.57 MG/DL — SIGNIFICANT CHANGE UP (ref 0.5–1.3)
EGFR: 100 ML/MIN/1.73M2 — SIGNIFICANT CHANGE UP
EOSINOPHIL # BLD AUTO: 0.08 K/UL — SIGNIFICANT CHANGE UP (ref 0–0.5)
EOSINOPHIL NFR BLD AUTO: 1.2 % — SIGNIFICANT CHANGE UP (ref 0–6)
GLUCOSE SERPL-MCNC: 131 MG/DL — HIGH (ref 70–99)
HCT VFR BLD CALC: 38.9 % — SIGNIFICANT CHANGE UP (ref 34.5–45)
HGB BLD-MCNC: 13.2 G/DL — SIGNIFICANT CHANGE UP (ref 11.5–15.5)
IMM GRANULOCYTES NFR BLD AUTO: 0.4 % — SIGNIFICANT CHANGE UP (ref 0–0.9)
LYMPHOCYTES # BLD AUTO: 2.61 K/UL — SIGNIFICANT CHANGE UP (ref 1–3.3)
LYMPHOCYTES # BLD AUTO: 38.4 % — SIGNIFICANT CHANGE UP (ref 13–44)
MCHC RBC-ENTMCNC: 33.1 PG — SIGNIFICANT CHANGE UP (ref 27–34)
MCHC RBC-ENTMCNC: 33.9 G/DL — SIGNIFICANT CHANGE UP (ref 32–36)
MCV RBC AUTO: 97.5 FL — SIGNIFICANT CHANGE UP (ref 80–100)
MONOCYTES # BLD AUTO: 0.78 K/UL — SIGNIFICANT CHANGE UP (ref 0–0.9)
MONOCYTES NFR BLD AUTO: 11.5 % — SIGNIFICANT CHANGE UP (ref 2–14)
NEUTROPHILS # BLD AUTO: 3.23 K/UL — SIGNIFICANT CHANGE UP (ref 1.8–7.4)
NEUTROPHILS NFR BLD AUTO: 47.6 % — SIGNIFICANT CHANGE UP (ref 43–77)
NRBC # BLD: 0 /100 WBCS — SIGNIFICANT CHANGE UP (ref 0–0)
PLATELET # BLD AUTO: 288 K/UL — SIGNIFICANT CHANGE UP (ref 150–400)
POTASSIUM SERPL-MCNC: 4.3 MMOL/L — SIGNIFICANT CHANGE UP (ref 3.5–5.3)
POTASSIUM SERPL-SCNC: 4.3 MMOL/L — SIGNIFICANT CHANGE UP (ref 3.5–5.3)
PROT SERPL-MCNC: 6.5 G/DL — SIGNIFICANT CHANGE UP (ref 6–8.3)
RBC # BLD: 3.99 M/UL — SIGNIFICANT CHANGE UP (ref 3.8–5.2)
RBC # FLD: 12.3 % — SIGNIFICANT CHANGE UP (ref 10.3–14.5)
SODIUM SERPL-SCNC: 138 MMOL/L — SIGNIFICANT CHANGE UP (ref 135–145)
WBC # BLD: 6.79 K/UL — SIGNIFICANT CHANGE UP (ref 3.8–10.5)
WBC # FLD AUTO: 6.79 K/UL — SIGNIFICANT CHANGE UP (ref 3.8–10.5)

## 2023-06-22 PROCEDURE — 99214 OFFICE O/P EST MOD 30 MIN: CPT

## 2023-06-22 RX ORDER — ONDANSETRON 8 MG/1
1 TABLET, FILM COATED ORAL
Refills: 0 | DISCHARGE

## 2023-06-22 RX ORDER — LOSARTAN POTASSIUM 100 MG/1
1 TABLET, FILM COATED ORAL
Refills: 0 | DISCHARGE

## 2023-06-22 RX ORDER — PANCRELIPASE 120000; 24000; 76000 [USP'U]/1; [USP'U]/1; [USP'U]/1
24000-76000 CAPSULE, DELAYED RELEASE PELLETS ORAL
Qty: 240 | Refills: 2 | Status: ACTIVE | COMMUNITY
Start: 2023-06-22 | End: 1900-01-01

## 2023-06-22 NOTE — PHYSICAL EXAM
[Fully active, able to carry on all pre-disease performance without restriction] : Status 0 - Fully active, able to carry on all pre-disease performance without restriction [Ulcers] : no ulcers [Mucositis] : no mucositis [Normal] : full range of motion and no deformities appreciated [de-identified] : Bowel habits altered with food intake , cramps and nausea occassionaly  [de-identified] : anxious

## 2023-06-22 NOTE — ASSESSMENT
[FreeTextEntry1] : 69 y/o woman with pancreatic adenocarcinoma, multifocal disease, initially some concern for possible liver metastatic disase but on re-review of PET scan more consistent with localized disease. She is active, her goal is to be aggressive to aim for curative, definitive approach. Her disease is multifocal in nature and neoadjuvant approach reasonable given that her disease would require a total pancreatectomy  we want to make sure disease biology is such that disease is truly confined to pancreas. Will aim for FOLFIRINOX given her excellent performance status. Given that she lives in [place], treatment at [place] would be more convenient to her but at this point will start her off on chemotherapy as next available appointment there is in July. Port placement today. Will also send off [facility] testing\par \par Plan:\par Pancreatic Adenocarcinoma - \par -S/p  port placement\par -Here for C # 1  FOLFIRINOX , 5FU 2400 oxali 85, irinotecan 150\par - will plan to re-scan after 2 months\par - [facility] testing sent on 6/22/23\par - [facility] testing on 6/22/23\par - PMDC re-review at 2 and 4 months\par \par Added Creon to help with digestion . \par RTC in 2 weeks \par Plan to transfer care to Dr [place]farland [place] ([place] .\par

## 2023-06-24 ENCOUNTER — APPOINTMENT (OUTPATIENT)
Dept: INFUSION THERAPY | Facility: HOSPITAL | Age: 68
End: 2023-06-24

## 2023-06-26 DIAGNOSIS — Z51.89 ENCOUNTER FOR OTHER SPECIFIED AFTERCARE: ICD-10-CM

## 2023-06-27 DIAGNOSIS — C25.9 MALIGNANT NEOPLASM OF PANCREAS, UNSPECIFIED: ICD-10-CM

## 2023-07-06 ENCOUNTER — NON-APPOINTMENT (OUTPATIENT)
Age: 68
End: 2023-07-06

## 2023-07-06 ENCOUNTER — APPOINTMENT (OUTPATIENT)
Dept: HEMATOLOGY ONCOLOGY | Facility: CLINIC | Age: 68
End: 2023-07-06

## 2023-07-06 ENCOUNTER — APPOINTMENT (OUTPATIENT)
Dept: INFUSION THERAPY | Facility: HOSPITAL | Age: 68
End: 2023-07-06

## 2023-07-06 LAB
MISCELLANEOUS TEST: NORMAL
PROC NAME: NORMAL

## 2023-07-08 ENCOUNTER — APPOINTMENT (OUTPATIENT)
Dept: INFUSION THERAPY | Facility: HOSPITAL | Age: 68
End: 2023-07-08

## 2023-07-12 ENCOUNTER — APPOINTMENT (OUTPATIENT)
Dept: HEMATOLOGY ONCOLOGY | Facility: CLINIC | Age: 68
End: 2023-07-12

## 2023-07-19 ENCOUNTER — APPOINTMENT (OUTPATIENT)
Dept: INFUSION THERAPY | Facility: CLINIC | Age: 68
End: 2023-07-19

## 2023-07-21 ENCOUNTER — APPOINTMENT (OUTPATIENT)
Dept: INFUSION THERAPY | Facility: CLINIC | Age: 68
End: 2023-07-21

## 2023-08-02 ENCOUNTER — APPOINTMENT (OUTPATIENT)
Dept: INFUSION THERAPY | Facility: CLINIC | Age: 68
End: 2023-08-02

## 2023-08-04 ENCOUNTER — APPOINTMENT (OUTPATIENT)
Dept: INFUSION THERAPY | Facility: CLINIC | Age: 68
End: 2023-08-04

## 2023-08-16 ENCOUNTER — APPOINTMENT (OUTPATIENT)
Dept: INFUSION THERAPY | Facility: CLINIC | Age: 68
End: 2023-08-16

## 2023-08-16 ENCOUNTER — APPOINTMENT (OUTPATIENT)
Dept: HEMATOLOGY ONCOLOGY | Facility: CLINIC | Age: 68
End: 2023-08-16

## 2023-08-18 ENCOUNTER — APPOINTMENT (OUTPATIENT)
Dept: INFUSION THERAPY | Facility: CLINIC | Age: 68
End: 2023-08-18

## 2023-10-01 PROBLEM — K86.89 PANCREATIC MASS: Status: ACTIVE | Noted: 2023-05-12

## 2023-10-20 ENCOUNTER — RX RENEWAL (OUTPATIENT)
Age: 68
End: 2023-10-20

## 2024-01-22 ENCOUNTER — RX RENEWAL (OUTPATIENT)
Age: 69
End: 2024-01-22

## 2024-02-05 ENCOUNTER — APPOINTMENT (OUTPATIENT)
Dept: ENDOCRINOLOGY | Facility: CLINIC | Age: 69
End: 2024-02-05

## 2024-03-28 NOTE — HISTORY OF PRESENT ILLNESS
The loop recorder was advanced into the 45 degree position with gentle, continuous, forward pressure. [de-identified] : ID: [id number] y/o woman with new diagnosis of pancreatic cancer, staging to be confirmed, presenting to medical oncology.\par \par Chronological Hx of Cancer:\par Apl  on vacation in [place], when her daughter noticed yellowing of her eyes\par 23 CT A/P showed masses in pancreas (body, tail, multifocal), and obstructive jaundice biliary ductal dilitation\par 23 s/p ERCP/EUS\par 23 CT A/P showed multi-focal pancreatic disease and vascular involvement\par 05/15/23 MRCP showed pancreatic masses, dominant lesion in the head/neck w SMV deformity, no liver lesion\par \par 23- PET - Hypermetabolic mass in the head of the pancreas, with a stent.\par Additional foci suspicious for additional sites of neoplasm, including the body of the pancreas, paraaortic region, as well as a suspected liver lesion.\par \par 23- S/p Port placement \par \par 23- C # 1 FOLFORINOX- 5 FU 2400 mg/m2/  mg/m2/ Oxaliplatin 85 mg/m2/ Irinotecan 150 mg/m2 \par \par \par ETOH: ex-smoker (Quit 42 yrs ago)   \par Smoking Hx: socially\par Family Ca Hx:  mother and sister w/ breast Ca  ; second  sister w/ cervical Ca and breast Ca  ; No prior genetic testing done  \par \par ECO \par \par Labs: \par 23 AST: 144   ALT: 335   ALK:  779 Tbili: 9.4  \par \par  23 Ca 19-9: 2780 CEA: 2.8  Tbili: 20. 7  \par \par 5/15/23 Ca 19-9: 651       CEA:  2.7          Tbili:  4.6\par \par AST:  80   ALT: 168    ALK: 434   \par \par \par \par \par PMHx (those that will influence cancer management plan): Her PMH includes; PMH: HTN, GERD, appendectomy(38yrs ago). \par Social Hx (including place of residency, job, hobby): lives with Daughter in [place], was previously living in [place]. Retired, wkred for the [employer]\par Fam Hx (aimee GI cancers):\par \par \par   [de-identified] : 6/22/23:\par Pt is seen in treatment room accompanied by her sister in law. \par Here for C # 1 of Folforinox.\par She is anxious about starting the chemotherapy . \par Denies Vomiting/ constipation.\par Occasional nausea and cramps aimee when she eats heavy / high fat diet .\par Discussed about starting Creon to help with her digestion \par RX sent to [facility] \par She has also had about 15 lbs weight loss since diagnosis \par SHe was prescribed Megace and she stopped taking it o her own - didn’t seem it helped much .\par Activity level low compared to pre diagnosis. \par Drug sheet was provided Risks/ benefits and alternatives of chemotherapy were discussed and included but not limited to fatigue,low blood counts, nausea/vomiting, skin reactions, hair thinning, possible blood transfusion requirement,increased risk of infection,neuropathy. \par \par

## 2024-04-18 ENCOUNTER — RX RENEWAL (OUTPATIENT)
Age: 69
End: 2024-04-18

## 2024-04-18 RX ORDER — LOSARTAN POTASSIUM 50 MG/1
50 TABLET, FILM COATED ORAL
Qty: 90 | Refills: 3 | Status: ACTIVE | COMMUNITY
Start: 2020-12-09 | End: 1900-01-01

## 2024-07-18 NOTE — H&P PST ADULT - BP NONINVASIVE DIASTOLIC (MM HG)
Problem: Fall/Injury  Goal: Not fall by end of shift  Outcome: Progressing  Goal: Be free from injury by end of the shift  Outcome: Progressing  Goal: Verbalize understanding of personal risk factors for fall in the hospital  Outcome: Progressing  Goal: Verbalize understanding of risk factor reduction measures to prevent injury from fall in the home  Outcome: Progressing  Goal: Use assistive devices by end of the shift  Outcome: Progressing  Goal: Pace activities to prevent fatigue by end of the shift  Outcome: Progressing     Problem: Fall/Injury  Goal: Not fall by end of shift  Outcome: Progressing  Goal: Be free from injury by end of the shift  Outcome: Progressing  Goal: Verbalize understanding of personal risk factors for fall in the hospital  Outcome: Progressing  Goal: Verbalize understanding of risk factor reduction measures to prevent injury from fall in the home  Outcome: Progressing  Goal: Use assistive devices by end of the shift  Outcome: Progressing  Goal: Pace activities to prevent fatigue by end of the shift  Outcome: Progressing     Problem: Fall/Injury  Goal: Not fall by end of shift  Outcome: Progressing  Goal: Be free from injury by end of the shift  Outcome: Progressing  Goal: Verbalize understanding of personal risk factors for fall in the hospital  Outcome: Progressing  Goal: Verbalize understanding of risk factor reduction measures to prevent injury from fall in the home  Outcome: Progressing  Goal: Use assistive devices by end of the shift  Outcome: Progressing  Goal: Pace activities to prevent fatigue by end of the shift  Outcome: Progressing   The patient's goals for the shift include Get at least 6-8 hrs of sleep/rest during shift.    The clinical goals for the shift include pt will rate pain 6/10 or less this shift    Over the shift, the patient did make progress toward the following goals. Pt awaiting ordered procedures for today. Continue with scheduled IV pain meds   76

## (undated) DEVICE — CONTAINER FORMALIN 80ML YELLOW

## (undated) DEVICE — TUBING TRUWAVE PRESSURE MALE/FEMALE 72"

## (undated) DEVICE — ORGANIZER MIO MEDICAL DEVICE DISP

## (undated) DEVICE — OLYMPUS DISTAL COVER ENDOSCOPE

## (undated) DEVICE — GOWN LG

## (undated) DEVICE — ELCTR ECG CONDUCTIVE ADHESIVE

## (undated) DEVICE — SNARE POLYP SENS SM 13MM 240CM

## (undated) DEVICE — SYS BIOPSY NDL FILE SS STRL 22G

## (undated) DEVICE — SOL INJ NS 0.9% 500ML 2 PORT

## (undated) DEVICE — DRSG 2X2

## (undated) DEVICE — FOLEY HOLDER STATLOCK 2 WAY ADULT

## (undated) DEVICE — TUBING MEDI-VAC W MAXIGRIP CONNECTORS 1/4"X6'

## (undated) DEVICE — NDL SHARK CORE SS STRL 22G

## (undated) DEVICE — SYR LUER LOK 10CC

## (undated) DEVICE — BIOPSY FORCEP RADIAL JAW 4 STANDARD WITH NEEDLE

## (undated) DEVICE — DVC AUTO CAP RX LOKG

## (undated) DEVICE — TUBING SUCTION 20FT

## (undated) DEVICE — SPHINCTEROTOME CLEVERCUT WIRE 25MM  2.8MM X 170CM

## (undated) DEVICE — DRSG CURITY GAUZE SPONGE 4 X 4" 12-PLY NON-STERILE

## (undated) DEVICE — BITE BLOCK ADULT 20 X 27MM (GREEN)

## (undated) DEVICE — TUBING SUCTION NONCONDUCTIVE 6MM X 12FT

## (undated) DEVICE — TUBING IV SET GRAVITY 3Y 100" MACRO

## (undated) DEVICE — DENTURE CUP PINK

## (undated) DEVICE — CATH IV SAFE BC 20G X 1.16" (PINK)

## (undated) DEVICE — NDL INJ SCLERO INTERJECT 23G

## (undated) DEVICE — SUCTION YANKAUER NO CONTROL VENT

## (undated) DEVICE — CATH IV SAFE BC 22G X 1" (BLUE)

## (undated) DEVICE — Device

## (undated) DEVICE — LITHOTRIPY BASKET TRAPEZOID 2.5CM

## (undated) DEVICE — BALLOON US ENDO

## (undated) DEVICE — PAPIL BILRTH II 6-5FRX0.035IN

## (undated) DEVICE — NDL INJ SCLERO INTERJECT 25G

## (undated) DEVICE — ELCTR GROUNDING PAD ADULT COVIDIEN

## (undated) DEVICE — SALIVA EJECTOR (BLUE)

## (undated) DEVICE — SNARE POLYP MINI ACCUSNR 1.5 X 3CM

## (undated) DEVICE — BASIN EMESIS 10IN GRADUATED MAUVE

## (undated) DEVICE — INJ SYS RAP REFIL

## (undated) DEVICE — PACK IV START WITH CHG

## (undated) DEVICE — SENSOR O2 FINGER ADULT

## (undated) DEVICE — NDL HYPO SAFE 22G X 1" (BLACK)

## (undated) DEVICE — POSITIONER FOAM HEAD CRADLE (PINK)

## (undated) DEVICE — OMNIPAQUE 300  30ML

## (undated) DEVICE — CLAMP BX HOT RAD JAW 3

## (undated) DEVICE — LUBRICATING JELLY HR ONE SHOT 3G

## (undated) DEVICE — BIOPSY FORCEP COLD DISP

## (undated) DEVICE — SYR LUER LOK 3CC

## (undated) DEVICE — BRUSH CYTO RAP EXCHG 3MM

## (undated) DEVICE — LINE BREATHE SAMPLNG

## (undated) DEVICE — BRUSH COLONOSCOPY CYTOLOGY

## (undated) DEVICE — UNDERPAD LINEN SAVER 17 X 24"

## (undated) DEVICE — SOL INJ NS 0.9% 500ML 1-PORT

## (undated) DEVICE — DRSG BANDAID 0.75X3"

## (undated) DEVICE — SOL IRR POUR NS 0.9% 500ML